# Patient Record
Sex: FEMALE | Race: WHITE | NOT HISPANIC OR LATINO | Employment: OTHER | URBAN - METROPOLITAN AREA
[De-identification: names, ages, dates, MRNs, and addresses within clinical notes are randomized per-mention and may not be internally consistent; named-entity substitution may affect disease eponyms.]

---

## 2017-08-10 ENCOUNTER — RADIANT APPOINTMENT (OUTPATIENT)
Dept: MAMMOGRAPHY | Facility: CLINIC | Age: 52
End: 2017-08-10
Attending: FAMILY MEDICINE
Payer: COMMERCIAL

## 2017-08-10 DIAGNOSIS — Z12.31 VISIT FOR SCREENING MAMMOGRAM: ICD-10-CM

## 2017-08-10 PROCEDURE — 77063 BREAST TOMOSYNTHESIS BI: CPT | Performed by: STUDENT IN AN ORGANIZED HEALTH CARE EDUCATION/TRAINING PROGRAM

## 2017-08-10 PROCEDURE — G0202 SCR MAMMO BI INCL CAD: HCPCS | Performed by: STUDENT IN AN ORGANIZED HEALTH CARE EDUCATION/TRAINING PROGRAM

## 2017-08-15 ENCOUNTER — OFFICE VISIT (OUTPATIENT)
Dept: PEDIATRICS | Facility: CLINIC | Age: 52
End: 2017-08-15
Payer: COMMERCIAL

## 2017-08-15 VITALS
HEART RATE: 67 BPM | SYSTOLIC BLOOD PRESSURE: 116 MMHG | TEMPERATURE: 97.5 F | BODY MASS INDEX: 25.85 KG/M2 | DIASTOLIC BLOOD PRESSURE: 70 MMHG | WEIGHT: 170.6 LBS | OXYGEN SATURATION: 100 % | HEIGHT: 68 IN

## 2017-08-15 DIAGNOSIS — Z23 NEED FOR MMR VACCINE: ICD-10-CM

## 2017-08-15 DIAGNOSIS — Z11.1 SCREENING EXAMINATION FOR PULMONARY TUBERCULOSIS: ICD-10-CM

## 2017-08-15 DIAGNOSIS — Z00.00 ROUTINE GENERAL MEDICAL EXAMINATION AT A HEALTH CARE FACILITY: Primary | ICD-10-CM

## 2017-08-15 PROCEDURE — 90471 IMMUNIZATION ADMIN: CPT | Performed by: FAMILY MEDICINE

## 2017-08-15 PROCEDURE — 99396 PREV VISIT EST AGE 40-64: CPT | Mod: 25 | Performed by: FAMILY MEDICINE

## 2017-08-15 PROCEDURE — 90707 MMR VACCINE SC: CPT | Performed by: FAMILY MEDICINE

## 2017-08-15 PROCEDURE — 86580 TB INTRADERMAL TEST: CPT | Performed by: FAMILY MEDICINE

## 2017-08-15 ASSESSMENT — PAIN SCALES - GENERAL: PAINLEVEL: NO PAIN (0)

## 2017-08-15 NOTE — PROGRESS NOTES
The patient is asked the following questions today and these are her answers:    -Have you had a mantoux administered in the past 30 days?    No  -Have you had a previous positive Mantoux.  No  -Have you received BCG in the past.  No  -Have you had a live vaccine  (MMR, Varicella, OPV, Yellow Fever) in the last 6 weeks.  No  -Have you had and active  viral or bacterial infection in the past 6 weeks.  No  -Have you received corticosteroids or immunosuppressive agents in the past 6 weeks.  No  -Have you been diagnosed with HIV?  No  -Do you have a maglinancy?  No    Patient informed of need to return to clinic in 48-72 hours for mantoux reading. Patient states understanding and agrees to plan.  Jan Little, CMA

## 2017-08-15 NOTE — MR AVS SNAPSHOT
After Visit Summary   8/15/2017    Clarita Rock    MRN: [id number]           Patient Information     Date Of Birth          1965        Visit Information        Provider Department      8/15/2017 12:00 PM Zahida Wiley MD [facility]        Today's Diagnoses     Routine general medical examination at a health care facility    -  1    Need for MMR vaccine        Screening examination for pulmonary tuberculosis          Care Instructions    Get the MMR shot today  Get the mantoux test today    Please drop us a copy of your immunization    Scheduled for fasting labs  - see appointment details below      Preventive Health Recommendations  Female Ages 50 - 64    Yearly exam: See your health care provider every year in order to  o Review health changes.   o Discuss preventive care.    o Review your medicines if your doctor has prescribed any.      Get a Pap test every three years (unless you have an abnormal result and your provider advises testing more often).    If you get Pap tests with HPV test, you only need to test every 5 years, unless you have an abnormal result.     You do not need a Pap test if your uterus was removed (hysterectomy) and you have not had cancer.    You should be tested each year for STDs (sexually transmitted diseases) if you're at risk.     Have a mammogram every 1 to 2 years.    Have a colonoscopy at age 50, or have a yearly FIT test (stool test). These exams screen for colon cancer.      Have a cholesterol test every 5 years, or more often if advised.    Have a diabetes test (fasting glucose) every three years. If you are at risk for diabetes, you should have this test more often.     If you are at risk for osteoporosis (brittle bone disease), think about having a bone density scan (DEXA).    Shots: Get a flu shot each year. Get a tetanus shot every 10 years.    Nutrition:     Eat at least 5 servings of fruits and vegetables each day.    Eat  whole-grain bread, whole-wheat pasta and brown rice instead of white grains and rice.    Talk to your provider about Calcium and Vitamin D.     Lifestyle    Exercise at least 150 minutes a week (30 minutes a day, 5 days a week). This will help you control your weight and prevent disease.    Limit alcohol to one drink per day.    No smoking.     Wear sunscreen to prevent skin cancer.     See your dentist every six months for an exam and cleaning.    See your eye doctor every 1 to 2 years.            Follow-ups after your visit        Your next 10 appointments already scheduled     Aug 18, 2017 11:20 AM CDT   LAB with LAB FIRST FLOOR [facility] ([facility])    [id number] [place] [address] [phone]           Patient must bring picture ID. Patient should be prepared to give a urine specimen  Please do not eat 10-12 hours before your appointment if you are coming in fasting for labs on lipids, cholesterol, or glucose (sugar). Pregnant women should follow their Care Team instructions. Water with medications is okay. Do not drink coffee or other fluids. If you have concerns about taking  your medications, please ask at office or if scheduling via Shoprocket, send a message by clicking on Secure Messaging, Message Your Care Team.            Aug 18, 2017 11:40 AM CDT   Nurse Only with [facility] ([facility])    [address] [place] [address] [address] [facility] [address] [phone]            Sep 05, 2017   Procedure with William Charles Duane, MD   [facility] (--)    [address] [place] [address] [facility] [address] [phone]              Who to contact     If you have questions or need follow up information about today's clinic visit or your schedule please contact [facility] directly at [phone].  Normal or non-critical lab and imaging results will be communicated to you by  "MyChart, letter or phone within 4 business days after the clinic has received the results. If you do not hear from us within 7 days, please contact the clinic through NetSpendhart or phone. If you have a critical or abnormal lab result, we will notify you by phone as soon as possible.  Submit refill requests through [employer] or call your pharmacy and they will forward the refill request to us. Please allow 3 business days for your refill to be completed.          Additional Information About Your Visit        [employer] Information     [employer] gives you secure access to your electronic health record. If you see a primary care provider, you can also send messages to your care team and make appointments. If you have questions, please call your primary care clinic.  If you do not have a primary care provider, please call [phone] and they will assist you.      [employer] is an electronic gateway that provides easy, online access to your medical records. With [employer], you can request a clinic appointment, read your test results, renew a prescription or communicate with your care team.     To access your existing account, please contact your [facility] or call [phone] for assistance.        Care EveryWhere ID     This is your Care EveryWhere ID. This could be used by other organizations to access your [place] medical records  [id number]        Your Vitals Were     Pulse Temperature Height Last Period Pulse Oximetry BMI (Body Mass Index)    67 97.5  F (36.4  C) (Oral) 5' 8.25\" (1.734 m) 08/08/2017 (Exact Date) 100% 25.75 kg/m2       Blood Pressure from Last 3 Encounters:   08/15/17 116/70   01/15/16 104/75    Weight from Last 3 Encounters:   08/15/17 170 lb 9.6 oz (77.4 kg)   01/15/16 163 lb 9.6 oz (74.2 kg)              We Performed the Following     MMR VIRUS IMMUNIZATION, SUBCUT     TB INTRADERMAL TEST        Primary Care Provider Office Phone # Fax #    Zahida Wiley [phone] " [phone]       [address]  [facility] [address]        Equal Access to Services     RICK CAMARA : Hadii natalia braga lia Spear, wakeeganda luqadaha, qaybta kaalmada rich, humberto wandain hayaageraldine ayersana maria sam ng. So [facility] [phone].    ATENCIÓN: Si habla español, tiene a goldsmith disposición servicios gratuitos de asistencia lingüística. Llame al [phone].    We comply with applicable federal civil rights laws and [place] laws. We do not discriminate on the basis of race, color, national origin, age, disability sex, sexual orientation or gender identity.            Thank you!     Thank you for choosing [facility]  for your care. Our goal is always to provide you with excellent care. Hearing back from our patients is one way we can continue to improve our services. Please take a few minutes to complete the written survey that you may receive in the mail after your visit with us. Thank you!             Your Updated Medication List - Protect others around you: Learn how to safely use, store and throw away your medicines at www.disposemymeds.org.          This list is accurate as of: 8/15/17 12:42 PM.  Always use your most recent med list.                   Brand Name Dispense Instructions for use Diagnosis    DAILY MULTIVITAMIN PO      Take 1 tablet by mouth daily        FINACEA 15 % gel   Generic drug:  Azelaic Acid      Apply topically daily        OMEGA-3 FISH OIL PO      Take 1,200 mg by mouth daily        VITAMIN D (CHOLECALCIFEROL) PO      Take 5,000 Units by mouth daily

## 2017-08-15 NOTE — NURSING NOTE
"Chief Complaint   Patient presents with     Physical       Initial /70  Pulse 67  Temp 97.5  F (36.4  C) (Oral)  Ht 5' 8.25\" (1.734 m)  Wt 170 lb 9.6 oz (77.4 kg)  LMP 08/08/2017 (Exact Date)  SpO2 100%  BMI 25.75 kg/m2 Estimated body mass index is 25.75 kg/(m^2) as calculated from the following:    Height as of this encounter: 5' 8.25\" (1.734 m).    Weight as of this encounter: 170 lb 9.6 oz (77.4 kg).  BP completed using cuff size: cayetano Little CMA    "

## 2017-08-15 NOTE — PATIENT INSTRUCTIONS
Get the MMR shot today  Get the mantoux test today    Please drop us a copy of your immunization    Scheduled for fasting labs  - see appointment details below      Preventive Health Recommendations  Female Ages 50 - 64    Yearly exam: See your health care provider every year in order to  o Review health changes.   o Discuss preventive care.    o Review your medicines if your doctor has prescribed any.      Get a Pap test every three years (unless you have an abnormal result and your provider advises testing more often).    If you get Pap tests with HPV test, you only need to test every 5 years, unless you have an abnormal result.     You do not need a Pap test if your uterus was removed (hysterectomy) and you have not had cancer.    You should be tested each year for STDs (sexually transmitted diseases) if you're at risk.     Have a mammogram every 1 to 2 years.    Have a colonoscopy at age 50, or have a yearly FIT test (stool test). These exams screen for colon cancer.      Have a cholesterol test every 5 years, or more often if advised.    Have a diabetes test (fasting glucose) every three years. If you are at risk for diabetes, you should have this test more often.     If you are at risk for osteoporosis (brittle bone disease), think about having a bone density scan (DEXA).    Shots: Get a flu shot each year. Get a tetanus shot every 10 years.    Nutrition:     Eat at least 5 servings of fruits and vegetables each day.    Eat whole-grain bread, whole-wheat pasta and brown rice instead of white grains and rice.    Talk to your provider about Calcium and Vitamin D.     Lifestyle    Exercise at least 150 minutes a week (30 minutes a day, 5 days a week). This will help you control your weight and prevent disease.    Limit alcohol to one drink per day.    No smoking.     Wear sunscreen to prevent skin cancer.     See your dentist every six months for an exam and cleaning.    See your eye doctor every 1 to 2  years.

## 2017-08-15 NOTE — PROGRESS NOTES
SUBJECTIVE:   CC: Clarita Rock is an 52 year old woman who presents for preventive health visit.     Physical   Annual:     Getting at least 3 servings of Calcium per day::  Yes    Bi-annual eye exam::  Yes    Dental care twice a year::  Yes    Sleep apnea or symptoms of sleep apnea::  None    Diet::  Regular (no restrictions)    Frequency of exercise::  2-3 days/week    Duration of exercise::  15-30 minutes    Taking medications regularly::  Not Applicable    Additional concerns today::  No              Today's PHQ-2 Score:   PHQ-2 ( 1999 Pfizer) 8/12/2017   Q1: Little interest or pleasure in doing things 0   Q2: Feeling down, depressed or hopeless 0   PHQ-2 Score 0   Q1: Little interest or pleasure in doing things Not at all   Q2: Feeling down, depressed or hopeless Not at all   PHQ-2 Score 0       Abuse: Current or Past(Physical, Sexual or Emotional)- No  Do you feel safe in your environment - Yes    Social History   Substance Use Topics     Smoking status: Former Smoker     Types: Cigarettes     Smokeless tobacco: Never Used     Alcohol use Yes     The patient does not drink >3 drinks per day nor >7 drinks per week.    Reviewed orders with patient.  Reviewed health maintenance and updated orders accordingly - Yes  Labs reviewed in EPIC  BP Readings from Last 3 Encounters:   08/15/17 116/70   01/15/16 104/75    Wt Readings from Last 3 Encounters:   08/15/17 170 lb 9.6 oz (77.4 kg)   01/15/16 163 lb 9.6 oz (74.2 kg)                  Patient Active Problem List   Diagnosis     Family history of thyroid disease     CARDIOVASCULAR SCREENING; LDL GOAL LESS THAN 160     Seasonal allergic rhinitis     Idiopathic urticaria     Family history of colon cancer, brother     Other fatigue     History of uterine fibroid     Past Surgical History:   Procedure Laterality Date     LASIK       VASCULAR SURGERY      varicose veins       Social History   Substance Use Topics     Smoking status: Former Smoker     Types:  Cigarettes     Smokeless tobacco: Never Used     Alcohol use Yes     Family History   Problem Relation Age of Onset     Thyroid Disease Mother      Childhood Heart Disease Mother      Obesity Mother      Thyroid Disease Sister      with thyroid removed     Colon Cancer Brother          Current Outpatient Prescriptions   Medication Sig Dispense Refill     FINACEA 15 % gel Apply topically daily       Multiple Vitamins-Minerals (DAILY MULTIVITAMIN PO) Take 1 tablet by mouth daily       VITAMIN D, CHOLECALCIFEROL, PO Take 5,000 Units by mouth daily       Omega-3 Fatty Acids (OMEGA-3 FISH OIL PO) Take 1,200 mg by mouth daily       No Known Allergies  Recent Labs   Lab Test  01/22/16   0924   LDL  79   HDL  66   TRIG  65   ALT  22   CR  0.72   GFRESTIMATED  86   GFRESTBLACK  >90   GFR Calc     POTASSIUM  4.0   TSH  0.97              Patient over age 50, mutual decision to screen reflected in health maintenance.      Pertinent mammograms are reviewed under the imaging tab.  History of abnormal Pap smear: NO - age 30- 65 PAP every 3 years recommended    Reviewed and updated as needed this visit by clinical staffTobacco  Allergies  Meds  Med Hx  Surg Hx  Fam Hx  Soc Hx        Reviewed and updated as needed this visit by Provider          Past Medical History:   Diagnosis Date     NO ACTIVE PROBLEMS       Past Surgical History:   Procedure Laterality Date     LASIK       VASCULAR SURGERY      varicose veins     Obstetric History     No data available            ROS:  C: NEGATIVE for fever, chills, change in weight  I: NEGATIVE for worrisome rashes, moles or lesions  E: NEGATIVE for vision changes or irritation  ENT: NEGATIVE for ear, mouth and throat problems  R: NEGATIVE for significant cough or SOB  B: NEGATIVE for masses, tenderness or discharge  CV: NEGATIVE for chest pain, palpitations or peripheral edema  GI: NEGATIVE for nausea, abdominal pain, heartburn, or change in bowel habits  : NEGATIVE  "for unusual urinary or vaginal symptoms. No vaginal bleeding.  M: NEGATIVE for significant arthralgias or myalgia  N: NEGATIVE for weakness, dizziness or paresthesias  E: NEGATIVE for temperature intolerance, skin/hair changes  H: NEGATIVE for bleeding problems  P: NEGATIVE for changes in mood or affect      OBJECTIVE:   /70  Pulse 67  Temp 97.5  F (36.4  C) (Oral)  Ht 5' 8.25\" (1.734 m)  Wt 170 lb 9.6 oz (77.4 kg)  LMP 08/08/2017 (Exact Date)  SpO2 100%  BMI 25.75 kg/m2  EXAM:  GENERAL: healthy, alert and no distress  EYES: Eyes grossly normal to inspection, PERRL and conjunctivae and sclerae normal  HENT: ear canals and TM's normal, nose and mouth without ulcers or lesions  NECK: no adenopathy, no asymmetry, masses, or scars and thyroid normal to palpation  RESP: lungs clear to auscultation - no rales, rhonchi or wheezes  BREAST: normal without masses, tenderness or nipple discharge and no palpable axillary masses or adenopathy  CV: regular rate and rhythm, normal S1 S2, no S3 or S4, no murmur, click or rub, no peripheral edema and peripheral pulses strong  ABDOMEN: soft, nontender, no hepatosplenomegaly, no masses and bowel sounds normal   (female): deferred per patient's request  MS: no gross musculoskeletal defects noted, no edema  SKIN: no suspicious lesions or rashes  NEURO: Normal strength and tone, mentation intact and speech normal  PSYCH: mentation appears normal, affect normal/bright    ASSESSMENT/PLAN:   1. Routine general medical examination at a health care facility  Discussed on regular exercises, daily calcium intake, healthy eating, self breast exams monthly and routine dental checks    - MMR VIRUS IMMUNIZATION, SUBCUT  - TB INTRADERMAL TEST    2. Need for MMR vaccine  Works in health care, speech therapist  - MMR VIRUS IMMUNIZATION, SUBCUT    3. Screening examination for pulmonary tuberculosis  as above    - TB INTRADERMAL TEST    COUNSELING:  Reviewed preventive health counseling, " "as reflected in patient instructions  Special attention given to:        Regular exercise       Healthy diet/nutrition       Vision screening       Immunizations    Vaccinated for: MMR           Osteoporosis Prevention/Bone Health       Colon cancer screening       Consider Hep C screening for patients born between 1945 and 1965       (Elizabeth)menopause management       The 10-year ASCVD risk score (Rosemarie MALAVE Jr, et al., 2013) is: 0.8%    Values used to calculate the score:      Age: 52 years      Sex: Female      Is Non- : No      Diabetic: No      Tobacco smoker: No      Systolic Blood Pressure: 116 mmHg      Is BP treated: No      HDL Cholesterol: 66 mg/dL      Total Cholesterol: 158 mg/dL           reports that she has quit smoking. Her smoking use included Cigarettes. She has never used smokeless tobacco.    Estimated body mass index is 25.75 kg/(m^2) as calculated from the following:    Height as of this encounter: 5' 8.25\" (1.734 m).    Weight as of this encounter: 170 lb 9.6 oz (77.4 kg).         Counseling Resources:  ATP IV Guidelines  Pooled Cohorts Equation Calculator  Breast Cancer Risk Calculator  FRAX Risk Assessment  ICSI Preventive Guidelines  Dietary Guidelines for Americans, 2010  [employer]'s MyPlate  ASA Prophylaxis  Lung CA Screening    Zahida Wiley MD  [facility] Health ServicesAnswers for HPI/ROS submitted by the patient on 8/12/2017   PHQ-2 Score: 0  Chart documentation done in part with Dragon Voice recognition Software. Although reviewed after completion, some word and grammatical error may remain.    "

## 2017-08-18 ENCOUNTER — ALLIED HEALTH/NURSE VISIT (OUTPATIENT)
Dept: PEDIATRICS | Facility: CLINIC | Age: 52
End: 2017-08-18
Payer: COMMERCIAL

## 2017-08-18 DIAGNOSIS — Z11.1 ENCOUNTER FOR PPD SKIN TEST READING: Primary | ICD-10-CM

## 2017-08-18 DIAGNOSIS — Z13.6 CARDIOVASCULAR SCREENING; LDL GOAL LESS THAN 160: ICD-10-CM

## 2017-08-18 DIAGNOSIS — Z13.0 SCREENING FOR DEFICIENCY ANEMIA: ICD-10-CM

## 2017-08-18 DIAGNOSIS — Z13.1 SCREENING FOR DIABETES MELLITUS (DM): ICD-10-CM

## 2017-08-18 DIAGNOSIS — Z83.49 FAMILY HISTORY OF THYROID DISEASE: ICD-10-CM

## 2017-08-18 DIAGNOSIS — Z11.59 NEED FOR HEPATITIS C SCREENING TEST: ICD-10-CM

## 2017-08-18 DIAGNOSIS — Z13.29 SCREENING FOR THYROID DISORDER: ICD-10-CM

## 2017-08-18 LAB
ALBUMIN SERPL-MCNC: 4.4 G/DL (ref 3.4–5)
ALP SERPL-CCNC: 43 U/L (ref 40–150)
ALT SERPL W P-5'-P-CCNC: 21 U/L (ref 0–50)
ANION GAP SERPL CALCULATED.3IONS-SCNC: 5 MMOL/L (ref 3–14)
AST SERPL W P-5'-P-CCNC: 12 U/L (ref 0–45)
BASOPHILS # BLD AUTO: 0.1 10E9/L (ref 0–0.2)
BASOPHILS NFR BLD AUTO: 0.8 %
BILIRUB SERPL-MCNC: 0.6 MG/DL (ref 0.2–1.3)
BUN SERPL-MCNC: 18 MG/DL (ref 7–30)
CALCIUM SERPL-MCNC: 9.1 MG/DL (ref 8.5–10.1)
CHLORIDE SERPL-SCNC: 107 MMOL/L (ref 94–109)
CHOLEST SERPL-MCNC: 194 MG/DL
CO2 SERPL-SCNC: 28 MMOL/L (ref 20–32)
CREAT SERPL-MCNC: 0.73 MG/DL (ref 0.52–1.04)
DIFFERENTIAL METHOD BLD: NORMAL
EOSINOPHIL # BLD AUTO: 0.5 10E9/L (ref 0–0.7)
EOSINOPHIL NFR BLD AUTO: 8 %
ERYTHROCYTE [DISTWIDTH] IN BLOOD BY AUTOMATED COUNT: 13.9 % (ref 10–15)
GFR SERPL CREATININE-BSD FRML MDRD: 83 ML/MIN/1.7M2
GLUCOSE SERPL-MCNC: 86 MG/DL (ref 70–99)
HCT VFR BLD AUTO: 39.3 % (ref 35–47)
HDLC SERPL-MCNC: 94 MG/DL
HGB BLD-MCNC: 13.5 G/DL (ref 11.7–15.7)
LDLC SERPL CALC-MCNC: 88 MG/DL
LYMPHOCYTES # BLD AUTO: 2 10E9/L (ref 0.8–5.3)
LYMPHOCYTES NFR BLD AUTO: 33.5 %
MCH RBC QN AUTO: 31.8 PG (ref 26.5–33)
MCHC RBC AUTO-ENTMCNC: 34.4 G/DL (ref 31.5–36.5)
MCV RBC AUTO: 93 FL (ref 78–100)
MONOCYTES # BLD AUTO: 0.4 10E9/L (ref 0–1.3)
MONOCYTES NFR BLD AUTO: 7.4 %
NEUTROPHILS # BLD AUTO: 3 10E9/L (ref 1.6–8.3)
NEUTROPHILS NFR BLD AUTO: 50.3 %
NONHDLC SERPL-MCNC: 100 MG/DL
PLATELET # BLD AUTO: 246 10E9/L (ref 150–450)
POTASSIUM SERPL-SCNC: 4.4 MMOL/L (ref 3.4–5.3)
PPDINDURATION: 0 MM (ref 0–5)
PPDREDNESS: 0 MM
PROT SERPL-MCNC: 7.8 G/DL (ref 6.8–8.8)
RBC # BLD AUTO: 4.25 10E12/L (ref 3.8–5.2)
SODIUM SERPL-SCNC: 140 MMOL/L (ref 133–144)
TRIGL SERPL-MCNC: 60 MG/DL
TSH SERPL DL<=0.005 MIU/L-ACNC: 0.95 MU/L (ref 0.4–4)
WBC # BLD AUTO: 6 10E9/L (ref 4–11)

## 2017-08-18 PROCEDURE — 80061 LIPID PANEL: CPT | Performed by: FAMILY MEDICINE

## 2017-08-18 PROCEDURE — 36415 COLL VENOUS BLD VENIPUNCTURE: CPT | Performed by: FAMILY MEDICINE

## 2017-08-18 PROCEDURE — 86803 HEPATITIS C AB TEST: CPT | Performed by: FAMILY MEDICINE

## 2017-08-18 PROCEDURE — 99207 ZZC NO CHARGE LOS: CPT

## 2017-08-18 PROCEDURE — 80050 GENERAL HEALTH PANEL: CPT | Performed by: FAMILY MEDICINE

## 2017-08-18 NOTE — MR AVS SNAPSHOT
After Visit Summary   8/18/2017    Clarita Rock    MRN: [id number]           Patient Information     Date Of Birth          1965        Visit Information        Provider Department      8/18/2017 11:40 AM [facility] ANCILLARY [facility]        Today's Diagnoses     Encounter for PPD skin test reading    -  1       Follow-ups after your visit        Your next 10 appointments already scheduled     Sep 05, 2017   Procedure with William Charles Duane, MD   [facility] (--)    [address] [place]  [place] [address] [phone]              Who to contact     If you have questions or need follow up information about today's clinic visit or your schedule please contact [facility] directly at [phone].  Normal or non-critical lab and imaging results will be communicated to you by MyChart, letter or phone within 4 business days after the clinic has received the results. If you do not hear from us within 7 days, please contact the clinic through DataProm[employer] or phone. If you have a critical or abnormal lab result, we will notify you by phone as soon as possible.  Submit refill requests through YouHelp or call your pharmacy and they will forward the refill request to us. Please allow 3 business days for your refill to be completed.          Additional Information About Your Visit        [employer] Information     YouHelp gives you secure access to your electronic health record. If you see a primary care provider, you can also send messages to your care team and make appointments. If you have questions, please call your primary care clinic.  If you do not have a primary care provider, please call [phone] and they will assist you.      YouHelp is an electronic gateway that provides easy, online access to your medical records. With YouHelp, you can request a clinic appointment, read your test results, renew a prescription or communicate with your care  team.     To access your existing account, please contact your [facility] or call [phone] for assistance.        Care EveryWhere ID     This is your Care EveryWhere ID. This could be used by other organizations to access your [place] medical records  [id number]        Your Vitals Were     Last Period                   08/08/2017 (Exact Date)            Blood Pressure from Last 3 Encounters:   08/15/17 116/70   01/15/16 104/75    Weight from Last 3 Encounters:   08/15/17 170 lb 9.6 oz (77.4 kg)   01/15/16 163 lb 9.6 oz (74.2 kg)              Today, you had the following     No orders found for display       Primary Care Provider Office Phone # Fax #    Zahida Wiley [phone] [address]  [facility] [address]        Equal Access to Services     [facility]: Hadii aad ku hadasho Soomaali, waaxda luqadaha, qaybta kaalmada adeegyada, humberto muñozin hayaan kingsley vargas . So [facility] [phone].    ATENCIÓN: Si habla español, tiene a goldsmith disposición servicios gratuitos de asistencia lingüística. Llame al [phone].    We comply with applicable federal civil rights laws and [place] laws. We do not discriminate on the basis of race, color, national origin, age, disability sex, sexual orientation or gender identity.            Thank you!     Thank you for choosing [facility]  for your care. Our goal is always to provide you with excellent care. Hearing back from our patients is one way we can continue to improve our services. Please take a few minutes to complete the written survey that you may receive in the mail after your visit with us. Thank you!             Your Updated Medication List - Protect others around you: Learn how to safely use, store and throw away your medicines at www.disposemymeds.org.          This list is accurate as of: 8/18/17 11:51 AM.  Always use your most recent med list.                   Brand  Name Dispense Instructions for use Diagnosis    DAILY MULTIVITAMIN PO      Take 1 tablet by mouth daily        FINACEA 15 % gel   Generic drug:  Azelaic Acid      Apply topically daily        OMEGA-3 FISH OIL PO      Take 1,200 mg by mouth daily        VITAMIN D (CHOLECALCIFEROL) PO      Take 5,000 Units by mouth daily

## 2017-08-18 NOTE — NURSING NOTE
Clarita Rock comes into clinic today at the request of Dr. Wiley Ordering Provider for mantoux test.    Mantou Results:      Mantoux placed at [facility](location) on 8/15/2017 (date) at 1243(time) to right forearm.    Mantoux results read 8/18/2017 (date) at 1146(time).    NEGATIVE. No induration.  No swelling.  No redness.  Induration:  0mm    Plan:  Patient given copy of results for her records.  Rolanda Og RN,   [facility]            This service provided today was under the supervising provider of the mercy Cárdenas CNP, who was available if needed.    Reason for visit: Mantoux results reading    Rolanda Og

## 2017-08-18 NOTE — PROGRESS NOTES
Dear Dr. Inez Otto is out of the office. I'm review your results on her behalf.     Here are your recent results which are within the expected range. Please continue with your current plan of care.     Please call or Mychart to our office if you have further questions.     Tiago Reynoso MD

## 2017-08-19 LAB — HCV AB SERPL QL IA: NONREACTIVE

## 2017-08-20 NOTE — PROGRESS NOTES
Marina Rock,    Attached are your test results.  -Hepatitis C antibody screen test shows no signs of a previous hepatitis C infection.   Please contact us if you have any questions.    Tresa Cárdenas, CNP

## 2017-09-05 ENCOUNTER — TELEPHONE (OUTPATIENT)
Dept: PEDIATRICS | Facility: CLINIC | Age: 52
End: 2017-09-05

## 2017-09-05 ENCOUNTER — HOSPITAL ENCOUNTER (OUTPATIENT)
Facility: AMBULATORY SURGERY CENTER | Age: 52
Discharge: HOME | End: 2017-09-05
Attending: INTERNAL MEDICINE | Admitting: INTERNAL MEDICINE
Payer: COMMERCIAL

## 2017-09-05 ENCOUNTER — SURGERY (OUTPATIENT)
Age: 52
End: 2017-09-05

## 2017-09-05 VITALS
OXYGEN SATURATION: 100 % | HEART RATE: 73 BPM | DIASTOLIC BLOOD PRESSURE: 64 MMHG | TEMPERATURE: 98 F | SYSTOLIC BLOOD PRESSURE: 110 MMHG | RESPIRATION RATE: 18 BRPM

## 2017-09-05 DIAGNOSIS — Z12.11 SCREENING FOR COLON CANCER: Primary | ICD-10-CM

## 2017-09-05 PROCEDURE — G8907 PT DOC NO EVENTS ON DISCHARG: HCPCS

## 2017-09-05 PROCEDURE — 45378 DIAGNOSTIC COLONOSCOPY: CPT | Mod: 74

## 2017-09-05 PROCEDURE — G8918 PT W/O PREOP ORDER IV AB PRO: HCPCS

## 2017-09-05 RX ORDER — FENTANYL CITRATE 50 UG/ML
INJECTION, SOLUTION INTRAMUSCULAR; INTRAVENOUS PRN
Status: DISCONTINUED | OUTPATIENT
Start: 2017-09-05 | End: 2017-09-05 | Stop reason: HOSPADM

## 2017-09-05 RX ORDER — LIDOCAINE 40 MG/G
CREAM TOPICAL
Status: DISCONTINUED | OUTPATIENT
Start: 2017-09-05 | End: 2017-09-06 | Stop reason: HOSPADM

## 2017-09-05 RX ORDER — ONDANSETRON 2 MG/ML
4 INJECTION INTRAMUSCULAR; INTRAVENOUS
Status: DISCONTINUED | OUTPATIENT
Start: 2017-09-05 | End: 2017-09-06 | Stop reason: HOSPADM

## 2017-09-05 RX ADMIN — FENTANYL CITRATE 50 MCG: 50 INJECTION, SOLUTION INTRAMUSCULAR; INTRAVENOUS at 08:08

## 2017-09-05 RX ADMIN — FENTANYL CITRATE 100 MCG: 50 INJECTION, SOLUTION INTRAMUSCULAR; INTRAVENOUS at 07:52

## 2017-09-05 NOTE — TELEPHONE ENCOUNTER
Please  inform patient  Reviewed  message from Dr. Rojas regarding incomplete colonoscopy as mentioned below  Per his request, orders placed for CT colonography   please help patient schedule( inform patient to check with insurance for coverage)

## 2017-09-05 NOTE — TELEPHONE ENCOUNTER
Called patient and gave message per Dr. Wiley.  Patient verbalized understanding and agreement to plan. She inquired about the CPT code to discuss with her insurance.  She will call back if further questions.  She did not want to be transferred as she needs to look at her schedule first.    Rhonda Ramsey RN, [facility]

## 2017-09-05 NOTE — TELEPHONE ENCOUNTER
----- Message from William Charles Duane, MD sent at 9/5/2017  9:06 AM CDT -----  Regarding: Incompletel colonoscopy  Hi Dr. Wiley  This patient had a colonoscopy this morning.  Unfortunately, despite more than an hour of trying various scopes and maneuvers, I was unable to reach her cecum.  At this point I would recommend a virtual colonoscopy.  Would you be willing to order that in as much as you will be following her.  Thanks  William Duane

## 2017-10-02 LAB — COLONOSCOPY: NORMAL

## 2017-10-08 ENCOUNTER — TRANSFERRED RECORDS (OUTPATIENT)
Dept: HEALTH INFORMATION MANAGEMENT | Facility: CLINIC | Age: 52
End: 2017-10-08

## 2018-10-22 ENCOUNTER — TRANSFERRED RECORDS (OUTPATIENT)
Dept: HEALTH INFORMATION MANAGEMENT | Facility: CLINIC | Age: 53
End: 2018-10-22

## 2019-01-30 ENCOUNTER — MYC MEDICAL ADVICE (OUTPATIENT)
Dept: PEDIATRICS | Facility: CLINIC | Age: 54
End: 2019-01-30

## 2019-04-19 ENCOUNTER — HEALTH MAINTENANCE LETTER (OUTPATIENT)
Age: 54
End: 2019-04-19

## 2019-04-26 ENCOUNTER — OFFICE VISIT (OUTPATIENT)
Dept: PEDIATRICS | Facility: CLINIC | Age: 54
End: 2019-04-26
Payer: COMMERCIAL

## 2019-04-26 VITALS
OXYGEN SATURATION: 99 % | DIASTOLIC BLOOD PRESSURE: 75 MMHG | HEIGHT: 68 IN | HEART RATE: 74 BPM | BODY MASS INDEX: 25.48 KG/M2 | TEMPERATURE: 96.8 F | WEIGHT: 168.1 LBS | SYSTOLIC BLOOD PRESSURE: 112 MMHG

## 2019-04-26 DIAGNOSIS — Z80.0 FAMILY HISTORY OF COLON CANCER: ICD-10-CM

## 2019-04-26 DIAGNOSIS — Z13.1 SCREENING FOR DIABETES MELLITUS (DM): ICD-10-CM

## 2019-04-26 DIAGNOSIS — Z11.4 SCREENING FOR HUMAN IMMUNODEFICIENCY VIRUS: ICD-10-CM

## 2019-04-26 DIAGNOSIS — Z12.39 BREAST CANCER SCREENING: ICD-10-CM

## 2019-04-26 DIAGNOSIS — Z11.1 SCREENING EXAMINATION FOR PULMONARY TUBERCULOSIS: ICD-10-CM

## 2019-04-26 DIAGNOSIS — Q43.8 TORTUOUS COLON: ICD-10-CM

## 2019-04-26 DIAGNOSIS — Z83.49 FAMILY HISTORY OF THYROID DISEASE: ICD-10-CM

## 2019-04-26 DIAGNOSIS — Z00.00 ROUTINE GENERAL MEDICAL EXAMINATION AT A HEALTH CARE FACILITY: Primary | ICD-10-CM

## 2019-04-26 DIAGNOSIS — Z13.6 CARDIOVASCULAR SCREENING; LDL GOAL LESS THAN 160: ICD-10-CM

## 2019-04-26 LAB
CHOLEST SERPL-MCNC: 230 MG/DL
GLUCOSE SERPL-MCNC: 93 MG/DL (ref 70–99)
HDLC SERPL-MCNC: 100 MG/DL
LDLC SERPL CALC-MCNC: 112 MG/DL
NONHDLC SERPL-MCNC: 130 MG/DL
TRIGL SERPL-MCNC: 92 MG/DL

## 2019-04-26 PROCEDURE — 36415 COLL VENOUS BLD VENIPUNCTURE: CPT | Performed by: FAMILY MEDICINE

## 2019-04-26 PROCEDURE — 99396 PREV VISIT EST AGE 40-64: CPT | Performed by: FAMILY MEDICINE

## 2019-04-26 PROCEDURE — 80061 LIPID PANEL: CPT | Performed by: FAMILY MEDICINE

## 2019-04-26 PROCEDURE — 82947 ASSAY GLUCOSE BLOOD QUANT: CPT | Performed by: FAMILY MEDICINE

## 2019-04-26 PROCEDURE — 86481 TB AG RESPONSE T-CELL SUSP: CPT | Performed by: FAMILY MEDICINE

## 2019-04-26 PROCEDURE — 87389 HIV-1 AG W/HIV-1&-2 AB AG IA: CPT | Performed by: FAMILY MEDICINE

## 2019-04-26 ASSESSMENT — PAIN SCALES - GENERAL: PAINLEVEL: NO PAIN (0)

## 2019-04-26 ASSESSMENT — MIFFLIN-ST. JEOR: SCORE: 1406

## 2019-04-26 NOTE — PATIENT INSTRUCTIONS
Schedule for pap only exam in 1 week  Get the labs today  Schedule for GI consult    Preventive Health Recommendations  Female Ages 50 - 64    Yearly exam: See your health care provider every year in order to  o Review health changes.   o Discuss preventive care.    o Review your medicines if your doctor has prescribed any.      Get a Pap test every three years (unless you have an abnormal result and your provider advises testing more often).    If you get Pap tests with HPV test, you only need to test every 5 years, unless you have an abnormal result.     You do not need a Pap test if your uterus was removed (hysterectomy) and you have not had cancer.    You should be tested each year for STDs (sexually transmitted diseases) if you're at risk.     Have a mammogram every 1 to 2 years.    Have a colonoscopy at age 50, or have a yearly FIT test (stool test). These exams screen for colon cancer.      Have a cholesterol test every 5 years, or more often if advised.    Have a diabetes test (fasting glucose) every three years. If you are at risk for diabetes, you should have this test more often.     If you are at risk for osteoporosis (brittle bone disease), think about having a bone density scan (DEXA).    Shots: Get a flu shot each year. Get a tetanus shot every 10 years.    Nutrition:     Eat at least 5 servings of fruits and vegetables each day.    Eat whole-grain bread, whole-wheat pasta and brown rice instead of white grains and rice.    Get adequate Calcium and Vitamin D.     Lifestyle    Exercise at least 150 minutes a week (30 minutes a day, 5 days a week). This will help you control your weight and prevent disease.    Limit alcohol to one drink per day.    No smoking.     Wear sunscreen to prevent skin cancer.     See your dentist every six months for an exam and cleaning.    See your eye doctor every 1 to 2 years.

## 2019-04-26 NOTE — PROGRESS NOTES
SUBJECTIVE:   CC: Estela Rock is an 54 year old woman who presents for preventive health visit.     Healthy Habits:     Getting at least 3 servings of Calcium per day:  Yes    Bi-annual eye exam:  Yes    Dental care twice a year:  Yes    Sleep apnea or symptoms of sleep apnea:  None    Diet:  Carbohydrate counting, Gluten-free/reduced and Other    Frequency of exercise:  2-3 days/week    Duration of exercise:  15-30 minutes    Taking medications regularly:  Yes    Medication side effects:  None    PHQ-2 Total Score: 0    Additional concerns today:  No            Today's PHQ-2 Score:   PHQ-2 ( 1999 [employer]) 4/23/2019   Q1: Little interest or pleasure in doing things 0   Q2: Feeling down, depressed or hopeless 0   PHQ-2 Score 0   Q1: Little interest or pleasure in doing things Not at all   Q2: Feeling down, depressed or hopeless Not at all   PHQ-2 Score 0       Abuse: Current or Past(Physical, Sexual or Emotional)- No  Do you feel safe in your environment? Yes    Social History     Tobacco Use     Smoking status: Former Smoker     Types: Cigarettes     Smokeless tobacco: Never Used   Substance Use Topics     Alcohol use: Yes         Alcohol Use 4/23/2019   Prescreen: >3 drinks/day or >7 drinks/week? No   Prescreen: >3 drinks/day or >7 drinks/week? -       Reviewed orders with patient.  Reviewed health maintenance and updated orders accordingly - Yes  Labs reviewed in EPIC  BP Readings from Last 3 Encounters:   04/26/19 112/75   09/05/17 110/64   08/15/17 116/70    Wt Readings from Last 3 Encounters:   04/26/19 76.2 kg (168 lb 1.6 oz)   08/15/17 77.4 kg (170 lb 9.6 oz)   01/15/16 74.2 kg (163 lb 9.6 oz)                  Patient Active Problem List   Diagnosis     Family history of thyroid disease     CARDIOVASCULAR SCREENING; LDL GOAL LESS THAN 160     Seasonal allergic rhinitis     Idiopathic urticaria     Family history of colon cancer, brother     Other fatigue     History of uterine fibroid     Tortuous  colon     Past Surgical History:   Procedure Laterality Date     COLONOSCOPY WITH CO2 INSUFFLATION N/A 9/5/2017    Procedure: COLONOSCOPY WITH CO2 INSUFFLATION;  BMI: 24.93  Referring: Dr. Wiley  Diagnosis: Screening for colon cancer   Pharmacy: [facility] [id number];  Surgeon: Duane, William Charles, MD;  Location:  OR     [facility]       VASCULAR SURGERY      varicose veins       Social History     Tobacco Use     Smoking status: Former Smoker     Types: Cigarettes     Smokeless tobacco: Never Used   Substance Use Topics     Alcohol use: Yes     Family History   Problem Relation Age of Onset     Thyroid Disease Mother      Childhood Heart Disease Mother      Obesity Mother      Thyroid Disease Sister         with thyroid removed     Colon Cancer Brother          Current Outpatient Medications   Medication Sig Dispense Refill     MAGNESIUM GLYCINATE PLUS PO Take 1 tablet by mouth daily       Multiple Vitamins-Minerals (DAILY MULTIVITAMIN PO) Take 1 tablet by mouth daily       Omega-3 Fatty Acids (OMEGA-3 FISH OIL PO) Take 1,200 mg by mouth daily       VITAMIN D, CHOLECALCIFEROL, PO Take 5,000 Units by mouth daily       No Known Allergies  Recent Labs   Lab Test 08/18/17  1108 01/22/16  0924   LDL 88 79   HDL 94 66   TRIG 60 65   ALT 21 22   CR 0.73 0.72   GFRESTIMATED 83 86   GFRESTBLACK >90 >90  African American GFR Calc     POTASSIUM 4.4 4.0   TSH 0.95 0.97        Mammogram Screening: Patient over age 50, mutual decision to screen reflected in health maintenance.    Pertinent mammograms are reviewed under the imaging tab.  History of abnormal Pap smear: NO - age 30- 65 PAP every 3 years recommended  PAP / HPV 1/15/2016   PAP NIL     Reviewed and updated as needed this visit by clinical staff  Tobacco  Allergies  Meds  Med Hx  Surg Hx  Fam Hx  Soc Hx        Reviewed and updated as needed this visit by Provider          Past Medical History:   Diagnosis Date     NO ACTIVE PROBLEMS       Past Surgical  "History:   Procedure Laterality Date     COLONOSCOPY WITH CO2 INSUFFLATION N/A 9/5/2017    Procedure: COLONOSCOPY WITH CO2 INSUFFLATION;  BMI: 24.93  Referring: Dr. Wiley  Diagnosis: Screening for colon cancer   Pharmacy: [facility] [id number];  Surgeon: Duane, William Charles, MD;  Location: [facility]       VASCULAR SURGERY      varicose veins     OB History   No data available       Review of Systems  CONSTITUTIONAL: NEGATIVE for fever, chills, change in weight  INTEGUMENTARY/SKIN: NEGATIVE for worrisome rashes, moles or lesions  EYES: NEGATIVE for vision changes or irritation  ENT: NEGATIVE for ear, mouth and throat problems  RESP: NEGATIVE for significant cough or SOB  BREAST: NEGATIVE for masses, tenderness or discharge  CV: NEGATIVE for chest pain, palpitations or peripheral edema  GI: NEGATIVE for nausea, abdominal pain, heartburn, or change in bowel habits  : NEGATIVE for unusual urinary or vaginal symptoms. No vaginal bleeding.  MUSCULOSKELETAL: NEGATIVE for significant arthralgias or myalgia  NEURO: NEGATIVE for weakness, dizziness or paresthesias  ENDOCRINE: NEGATIVE for temperature intolerance, skin/hair changes  HEME/ALLERGY/IMMUNE: NEGATIVE for bleeding problems  PSYCHIATRIC: NEGATIVE for changes in mood or affect      OBJECTIVE:   /75 (BP Location: Right arm, Patient Position: Sitting, Cuff Size: Adult Regular)   Pulse 74   Temp 96.8  F (36  C) (Tympanic)   Ht 1.727 m (5' 8\")   Wt 76.2 kg (168 lb 1.6 oz)   LMP 04/23/2019   SpO2 99%   BMI 25.56 kg/m    Physical Exam  GENERAL APPEARANCE: healthy, alert and no distress  EYES: Eyes grossly normal to inspection, PERRL and conjunctivae and sclerae normal  HENT: ear canals and TM's normal, nose and mouth without ulcers or lesions, oropharynx clear and oral mucous membranes moist  NECK: no adenopathy, no asymmetry, masses, or scars and thyroid normal to palpation  RESP: lungs clear to auscultation - no rales, rhonchi or " wheezes  BREAST: normal without masses, tenderness or nipple discharge and no palpable axillary masses or adenopathy  CV: regular rate and rhythm, normal S1 S2, no S3 or S4, no murmur, click or rub, no peripheral edema and peripheral pulses strong  ABDOMEN: soft, nontender, no hepatosplenomegaly, no masses and bowel sounds normal   (female): Deferred due to patient's reported having current menstrual bleeding  MS: no musculoskeletal defects are noted and gait is age appropriate without ataxia  SKIN: no suspicious lesions or rashes  NEURO: Normal strength and tone, sensory exam grossly normal, mentation intact and speech normal  PSYCH: mentation appears normal and affect normal/bright    Diagnostic Test Results:  none     ASSESSMENT/PLAN:   1. Routine general medical examination at a health care facility  Discussed on regular exercises, daily calcium intake, healthy eating, self breast exams monthly and routine dental checks  recommended patient to have it done at the pharmacy after checking with insurance for coverage.      - MA Screening Digital Bilateral; Future  - Glucose  - Lipid panel reflex to direct LDL Fasting  - HIV Antigen Antibody Combo  - Quantiferon TB Gold Plus    2. Family history of colon cancer  Patient had history of tortuous colon, had a incomplete colonoscopy in 2017, had CT colonography.  Patient is not sure if she needs another study or colonoscopy due to her family history.  She vaguely recalls getting recommendation from Dr. Duane regarding colonoscopy in 2 years but she is worried about getting it done due to her long and tortuous colon  Patient will see gastroenterologist first for consult and then get the procedure done if needed.    - GASTROENTEROLOGY ADULT REF CONSULT ONLY    3. Tortuous colon  as above    - GASTROENTEROLOGY ADULT REF CONSULT ONLY    4. Breast cancer screening    - MA Screening Digital Bilateral; Future    5. Screening for human immunodeficiency virus    - HIV Antigen  "Antibody Combo    6. Screening examination for pulmonary tuberculosis    - Quantiferon TB Gold Plus    7. CARDIOVASCULAR SCREENING; LDL GOAL LESS THAN 160    - Lipid panel reflex to direct LDL Fasting    8. Family history of thyroid disease  TSH   Date Value Ref Range Status   2017 0.95 0.40 - 4.00 mU/L Final   2016 0.97 0.40 - 4.00 mU/L Final         9. Screening for diabetes mellitus (DM)    - Glucose    COUNSELING:  Reviewed preventive health counseling, as reflected in patient instructions  Special attention given to:        Regular exercise       Healthy diet/nutrition       Vision screening       Osteoporosis Prevention/Bone Health       HIV screeninx in teen years, 1x in adult years, and at intervals if high risk       (Elizabeth)menopause management       The 10-year ASCVD risk score (Rosemarie MALAVE Jr., et al., 2013) is: 0.9%    Values used to calculate the score:      Age: 54 years      Sex: Female      Is Non- : No      Diabetic: No      Tobacco smoker: No      Systolic Blood Pressure: 112 mmHg      Is BP treated: No      HDL Cholesterol: 94 mg/dL      Total Cholesterol: 194 mg/dL       Advance Care Planning    BP Readings from Last 1 Encounters:   19 112/75     Estimated body mass index is 25.56 kg/m  as calculated from the following:    Height as of this encounter: 1.727 m (5' 8\").    Weight as of this encounter: 76.2 kg (168 lb 1.6 oz).      Weight management plan: Discussed healthy diet and exercise guidelines     reports that she has quit smoking. Her smoking use included cigarettes. She has never used smokeless tobacco.      Counseling Resources:  ATP IV Guidelines  Pooled Cohorts Equation Calculator  Breast Cancer Risk Calculator  FRAX Risk Assessment  ICSI Preventive Guidelines  Dietary Guidelines for Americans,   [employer] MyPlate  ASA Prophylaxis  Lung CA Screening    Zahida Wiley MD  [facility]  Chart documentation done in part with " [employer] Voice recognition Software. Although reviewed after completion, some word and grammatical error may remain.

## 2019-04-29 LAB
GAMMA INTERFERON BACKGROUND BLD IA-ACNC: 0.08 IU/ML
HIV 1+2 AB+HIV1 P24 AG SERPL QL IA: NONREACTIVE
M TB IFN-G BLD-IMP: NEGATIVE
M TB IFN-G CD4+ BCKGRND COR BLD-ACNC: 9.08 IU/ML
MITOGEN IGNF BCKGRD COR BLD-ACNC: 0 IU/ML
MITOGEN IGNF BCKGRD COR BLD-ACNC: 0 IU/ML

## 2019-04-29 NOTE — RESULT ENCOUNTER NOTE
Dear Jemma,  Your lab test showed normal results for fasting blood sugar and slightly elevated total and LDL( BAD) cholesterol, which is not concerning given the elevated HDL cholesterol, this is good  Your test results for HIV and tuberculosis screening are both negative, this is good and reassuring.   Let me know if you have any questions. Take care.  Zahida Wiley MD

## 2019-05-11 ENCOUNTER — OFFICE VISIT (OUTPATIENT)
Dept: PEDIATRICS | Facility: CLINIC | Age: 54
End: 2019-05-11
Payer: COMMERCIAL

## 2019-05-11 VITALS
OXYGEN SATURATION: 100 % | DIASTOLIC BLOOD PRESSURE: 73 MMHG | SYSTOLIC BLOOD PRESSURE: 107 MMHG | WEIGHT: 170.1 LBS | HEART RATE: 71 BPM | BODY MASS INDEX: 25.86 KG/M2 | TEMPERATURE: 97.9 F

## 2019-05-11 DIAGNOSIS — Z12.4 SCREENING FOR CERVICAL CANCER: Primary | ICD-10-CM

## 2019-05-11 PROCEDURE — 87624 HPV HI-RISK TYP POOLED RSLT: CPT | Performed by: FAMILY MEDICINE

## 2019-05-11 PROCEDURE — G0145 SCR C/V CYTO,THINLAYER,RESCR: HCPCS | Performed by: FAMILY MEDICINE

## 2019-05-11 NOTE — PROGRESS NOTES
SUBJECTIVE:   Estela Rock is a 54 year old female who presents to clinic today for the following   health issues:      Concern - Pap only  Patient is here for pelvic exam and Pap which was not done at her physical on 4/26/19, due to patient being in menstruation          Additional history: as documented    Reviewed  and updated as needed this visit by clinical staff         Reviewed and updated as needed this visit by Provider         Patient Active Problem List   Diagnosis     Family history of thyroid disease     CARDIOVASCULAR SCREENING; LDL GOAL LESS THAN 160     Seasonal allergic rhinitis     Idiopathic urticaria     Family history of colon cancer, brother     Other fatigue     History of uterine fibroid     Tortuous colon     Past Surgical History:   Procedure Laterality Date     COLONOSCOPY WITH CO2 INSUFFLATION N/A 9/5/2017    Procedure: COLONOSCOPY WITH CO2 INSUFFLATION;  BMI: 24.93  Referring: Dr. Wiley  Diagnosis: Screening for colon cancer   Pharmacy: [facility] [id number];  Surgeon: Duane, William Charles, MD;  Location:  OR     LASIK       VASCULAR SURGERY      varicose veins       Social History     Tobacco Use     Smoking status: Former Smoker     Types: Cigarettes     Smokeless tobacco: Never Used   Substance Use Topics     Alcohol use: Yes     Family History   Problem Relation Age of Onset     Thyroid Disease Mother      Childhood Heart Disease Mother      Obesity Mother      Thyroid Disease Sister         with thyroid removed     Colon Cancer Brother          Current Outpatient Medications   Medication Sig Dispense Refill     Multiple Vitamins-Minerals (DAILY MULTIVITAMIN PO) Take 1 tablet by mouth daily       MAGNESIUM GLYCINATE PLUS PO Take 1 tablet by mouth daily       Omega-3 Fatty Acids (OMEGA-3 FISH OIL PO) Take 1,200 mg by mouth daily       VITAMIN D, CHOLECALCIFEROL, PO Take 5,000 Units by mouth daily       No Known Allergies  Recent Labs   Lab Test 04/26/19  5370  08/18/17  1108 01/22/16  0924   * 88 79    94 66   TRIG 92 60 65   ALT  --  21 22   CR  --  0.73 0.72   GFRESTIMATED  --  83 86   GFRESTBLACK  --  >90 >90  African American GFR Calc     POTASSIUM  --  4.4 4.0   TSH  --  0.95 0.97      BP Readings from Last 3 Encounters:   05/11/19 107/73   04/26/19 112/75   09/05/17 110/64    Wt Readings from Last 3 Encounters:   05/11/19 77.2 kg (170 lb 1.6 oz)   04/26/19 76.2 kg (168 lb 1.6 oz)   08/15/17 77.4 kg (170 lb 9.6 oz)                  Labs reviewed in EPIC    ROS:  CONSTITUTIONAL: NEGATIVE for fever, chills, change in weight  : as above    OBJECTIVE:     /73 (BP Location: Right arm, Patient Position: Chair, Cuff Size: Adult Regular)   Pulse 71   Temp 97.9  F (36.6  C) (Temporal)   Wt 77.2 kg (170 lb 1.6 oz)   LMP 04/23/2019   SpO2 100%   BMI 25.86 kg/m    Body mass index is 25.86 kg/m .  GENERAL: healthy, alert and no distress   (female): normal female external genitalia, normal urethral meatus, vaginal mucosa, normal cervix/adnexa/uterus without masses or discharge   (female): trace old menstrual blood noted    Diagnostic Test Results:  none     ASSESSMENT/PLAN:             1. Screening for cervical cancer    - Pap imaged thin layer screen with HPV - recommended age 30 - 65 years (select HPV order below)  - HPV High Risk Types DNA Cervical    Chart documentation done in part with Dragon Voice recognition Software. Although reviewed after completion, some word and grammatical error may remain.    See Patient Instructions    Zahida Wiley MD  [facility]

## 2019-05-15 LAB
COPATH REPORT: NORMAL
PAP: NORMAL

## 2019-05-16 LAB
FINAL DIAGNOSIS: NORMAL
HPV HR 12 DNA CVX QL NAA+PROBE: NEGATIVE
HPV16 DNA SPEC QL NAA+PROBE: NEGATIVE
HPV18 DNA SPEC QL NAA+PROBE: NEGATIVE
SPECIMEN DESCRIPTION: NORMAL
SPECIMEN SOURCE CVX/VAG CYTO: NORMAL

## 2019-06-14 ENCOUNTER — OFFICE VISIT (OUTPATIENT)
Dept: GASTROENTEROLOGY | Facility: CLINIC | Age: 54
End: 2019-06-14
Attending: FAMILY MEDICINE
Payer: COMMERCIAL

## 2019-06-14 VITALS
OXYGEN SATURATION: 94 % | HEART RATE: 80 BPM | SYSTOLIC BLOOD PRESSURE: 105 MMHG | WEIGHT: 169.9 LBS | HEIGHT: 68 IN | BODY MASS INDEX: 25.75 KG/M2 | DIASTOLIC BLOOD PRESSURE: 75 MMHG

## 2019-06-14 DIAGNOSIS — Z80.0 FAMILY HISTORY OF COLON CANCER: Primary | ICD-10-CM

## 2019-06-14 DIAGNOSIS — Q43.8 TORTUOUS COLON: ICD-10-CM

## 2019-06-14 PROCEDURE — 99204 OFFICE O/P NEW MOD 45 MIN: CPT | Performed by: INTERNAL MEDICINE

## 2019-06-14 ASSESSMENT — PAIN SCALES - GENERAL: PAINLEVEL: NO PAIN (0)

## 2019-06-14 ASSESSMENT — MIFFLIN-ST. JEOR: SCORE: 1414.16

## 2019-06-14 NOTE — NURSING NOTE
"Estela Rock's goals for this visit include:   Chief Complaint   Patient presents with     Consult     Family history of colon cancer, tortuous colon       She requests these members of her care team be copied on today's visit information: yes    PCP: Zahida Wiley    Referring Provider:  Zahida Wiley MD  [address] [place] [address]    /75   Pulse 80   Ht 1.727 m (5' 8\")   Wt 77.1 kg (169 lb 14.4 oz)   SpO2 94%   BMI 25.83 kg/m      Do you need any medication refills at today's visit? No    Jose Rafael Oreilly CMA      "

## 2019-06-14 NOTE — PROGRESS NOTES
GASTROENTEROLOGY NEW PATIENT CLINIC VISIT    CC/REFERRING MD:    Zahida Wiley    REASON FOR CONSULTATION:   Zahida Wiley for   Chief Complaint   Patient presents with     Consult     Family history of colon cancer, tortuous colon       HISTORY OF PRESENT ILLNESS:    Estela Rock is 54 year old female who presents for evaluation of colon cancer screening.  She notes that she has a family history of colon cancer in her brother age 57, grandparents in their 80s, uncle in her 70s, aunt in her 70s and possibly dead in his 70s.  2 years ago, she went for a screening colonoscopy and her colonoscopy was incomplete due to a tortuous colon.  She had a follow-up CT colonoscopy which noted 2 small polyps but no masses though the distention of the right colon was incomplete.  She reports that she was told to have a follow-up exam in 2 years time and so she presents today to discuss options.  She notes that she is not having any symptoms.  She has no abdominal pain, no rectal bleeding, no weight loss and no other concerns at this point.    PROBLEM LIST  Patient Active Problem List    Diagnosis Date Noted     Tortuous colon 04/26/2019     Priority: Medium     Family history of thyroid disease 01/15/2016     Priority: Medium     CARDIOVASCULAR SCREENING; LDL GOAL LESS THAN 160 01/15/2016     Priority: Medium     Seasonal allergic rhinitis 01/15/2016     Priority: Medium     Idiopathic urticaria 01/15/2016     Priority: Medium     Family history of colon cancer, brother 01/15/2016     Priority: Medium     Other fatigue 01/15/2016     Priority: Medium     History of uterine fibroid 01/15/2016     Priority: Medium       PERTINENT PAST MEDICAL HISTORY:  (I personally reviewed this history with the patient at today's visit)   Past Medical History:   Diagnosis Date     NO ACTIVE PROBLEMS          PREVIOUS SURGERIES: (I personally reviewed this history with the patient at today's visit)   Past  Surgical History:   Procedure Laterality Date     COLONOSCOPY WITH CO2 INSUFFLATION N/A 9/5/2017    Procedure: COLONOSCOPY WITH CO2 INSUFFLATION;  BMI: 24.93  Referring: Dr. Wiley  Diagnosis: Screening for colon cancer   Pharmacy: [facility] [phone];  Surgeon: Duane, William Charles, MD;  Location: [facility] ER       VASCULAR SURGERY      varicose veins         ALLERGIES:   No Known Allergies    PERTINENT MEDICATIONS:    Current Outpatient Medications:      MAGNESIUM GLYCINATE PLUS PO, Take 1 tablet by mouth daily, Disp: , Rfl:      Multiple Vitamins-Minerals (DAILY MULTIVITAMIN PO), Take 1 tablet by mouth daily, Disp: , Rfl:      Omega-3 Fatty Acids (OMEGA-3 FISH OIL PO), Take 1,200 mg by mouth daily, Disp: , Rfl:      VITAMIN D, CHOLECALCIFEROL, PO, Take 5,000 Units by mouth daily, Disp: , Rfl:     SOCIAL HISTORY:  Social History     Socioeconomic History     Marital status:      Spouse name: Not on file     Number of children: Not on file     Years of education: Not on file     Highest education level: Not on file   Occupational History     Not on file   Social Needs     Financial resource strain: Not on file     Food insecurity:     Worry: Not on file     Inability: Not on file     Transportation needs:     Medical: Not on file     Non-medical: Not on file   Tobacco Use     Smoking status: Former Smoker     Types: Cigarettes     Smokeless tobacco: Never Used   Substance and Sexual Activity     Alcohol use: Yes     Drug use: No     Sexual activity: Yes     Partners: Male   Lifestyle     Physical activity:     Days per week: Not on file     Minutes per session: Not on file     Stress: Not on file   Relationships     Social connections:     Talks on phone: Not on file     Gets together: Not on file     Attends [employer] service: Not on file     Active member of club or organization: Not on file     Attends meetings of clubs or organizations: Not on file     Relationship status: Not on file      "Intimate partner violence:     Fear of current or ex partner: Not on file     Emotionally abused: Not on file     Physically abused: Not on file     Forced sexual activity: Not on file   Other Topics Concern     Parent/sibling w/ CABG, MI or angioplasty before 65F 55M? No   Social History Narrative     Not on file       FAMILY HISTORY: (I personally reviewed this history with the patient at today's visit)  Family History   Problem Relation Age of Onset     Thyroid Disease Mother      Childhood Heart Disease Mother      Obesity Mother      Thyroid Disease Sister         with thyroid removed     Colon Cancer Brother         ROS:    No fevers or chills  No weight loss  No blurry vision, double vision or change in vision  No sore throat  No lymphadenopathy  No headache, paraesthesias, or weakness in a limb  No shortness of breath or wheezing  No chest pain or pressure  No arthralgias or myalgias  No rashes or skin changes  No odynophagia or dysphagia  No BRBPR, hematochezia, melena  No dysuria, frequency or urgency  No hot/cold intolerance or polyria  No anxiety or depression  PHYSICAL EXAMINATION:  Constitutional: aaox3, cooperative, pleasant, not dyspneic/diaphoretic, no acute distress  Vitals reviewed: /75   Pulse 80   Ht 1.727 m (5' 8\")   Wt 77.1 kg (169 lb 14.4 oz)   SpO2 94%   BMI 25.83 kg/m    Wt:   Wt Readings from Last 2 Encounters:   06/14/19 77.1 kg (169 lb 14.4 oz)   05/11/19 77.2 kg (170 lb 1.6 oz)      Eyes: Sclera anicteric/injected  Ears/nose/mouth/throat: Normal oropharynx without ulcers or exudate, mucus membranes moist, hearing intact  Neck: supple, thyroid normal size  CV: No edema, RRR  Respiratory: Unlabored breathing, CTAB  Lymph: No submandibular, supraclavicular or inguinal lymphadenopathy  Abd: Nondistended, no masses, +bs, no hepatosplenomegaly, nontender, no peritoneal signs  Skin: warm, perfused, no jaundice  Psych: Normal affect  MSK: Normal gait      PERTINENT STUDIES: (I " personally reviewed these laboratory studies today)  Most recent CBC:   Recent Labs   Lab Test 17  1108 16  0924   WBC 6.0 6.1   HGB 13.5 13.5   HCT 39.3 39.1    225     Most recent hepatic panel:  Recent Labs   Lab Test 17  1108 16  0924   ALT 21 22   AST 12 8     Most recent creatinine:  Recent Labs   Lab Test 178 16  0924   CR 0.73 0.72       RADIOLOGY:      Examination:  CT COLONOGRAPHY SCREENING 10/31/2017 11:02 AM      Comparison: None.     History: Incomplete colonoscopy; right: Not examined     Technique: CT of the abdomen and pelvis was performed utilizing a CT  colonography protocol without intravenous contrast. CO2 was instilled  into the rectum and supine/prone CT imaging was obtained.  3-D  reconstructions were created by the radiologist and viewed on a  separate [employer] workstation.     Exam quality:   The exam is of diagnostic quality with inadequate  colonic prep at the proximal ascending colon approximately 115 cm from  rectum with inadequate colonic distention ; otherwise colonic prep and  distention adequate.     Findings:   This examination is designed for detection of polyps or masses greater  than 6 mm. Small polyps are not excluded by this exam.     Colon:   Several small (less than 6 mm) polyps, includin millimeter sessile polyp in the distal left colon (series 3, image  356 and series 7, image 344).     4 mm sessile polyp arises from a fold in the mid left colon (series 3,  image 296 and series 7, image 280).     No worrisome chronic mass or polyp.      No bowel obstruction. Normal appendix. Scattered distal colon  diverticulosis without CT evidence of diverticulitis.     Additional incidental findings on limited noncontrast low dose exam:  Normal noncontrast appearance of the liver, gallbladder and spleen  Exophytic left renal cyst. Subcentimeter apparent gross fat containing  right renal lesion, potentially an AML. Negative adrenal  glands.  Pancreas is unremarkable. No abdominal aortic aneurysm. No abdominal  pelvic adenopathy. Normal uterine appearance. No adnexal mass.                                                                      Impression:  1. No worrisome colonic polyp or mass is identified; noting a short  segment of the ascending colon was not well evaluated owing to  incomplete distention.  2. Scattered colonic diverticulosis with no CT evidence of  diverticulitis.     I have personally reviewed the examination and initial interpretation  and I agree with the findings.     CHENG ESCOBEDO MD    ASSESSMENT/PLAN:    Estela Rock is a 54 year old female who presents for evaluation of colon cancer screening.  She is at elevated risk of colon cancer with a first-degree relative under age 60 having colon cancer and multiple second-degree relatives having colon cancer.  She has not had any thorough evaluation of her right colon by either prior colonoscopy or on her CT colonography, thus it is reasonable to pursue additional evaluation at this time.  We discussed the option of scheduling a colonoscopy with anesthesia which is generally successful in order to complete the colonoscopy versus repeating a CT colonoscopy though we note that if polyps were found, she would likely require a colonoscopy anyway.  We also discussed stool based testing though this is not a good idea for her given she is at elevated risk of colon cancer.  She has opted to schedule a colonoscopy with MAC which is very reasonable.  We did discuss that timing interval of her next colonoscopy will depend on the findings of that exam but she should never extend surveillance interval beyond 5 years due to family history.      Family history of colon cancer      RTC PRN    Thank you for this consultation.  It was a pleasure to participate in the care of this patient; please contact us with any further questions.      This note was created with voice recognition  software, and while reviewed for accuracy, typos may remain.     Duy Frey MD  Adjunct  of Medicine  Division of Gastroenterology, Hepatology and Nutrition  [facility]  [phone]

## 2019-07-31 ENCOUNTER — TELEPHONE (OUTPATIENT)
Dept: PEDIATRICS | Facility: CLINIC | Age: 54
End: 2019-07-31

## 2019-07-31 ENCOUNTER — OFFICE VISIT (OUTPATIENT)
Dept: PEDIATRICS | Facility: CLINIC | Age: 54
End: 2019-07-31
Payer: COMMERCIAL

## 2019-07-31 VITALS
DIASTOLIC BLOOD PRESSURE: 69 MMHG | BODY MASS INDEX: 26.07 KG/M2 | HEART RATE: 72 BPM | HEIGHT: 68 IN | WEIGHT: 172 LBS | OXYGEN SATURATION: 100 % | TEMPERATURE: 98.1 F | SYSTOLIC BLOOD PRESSURE: 108 MMHG

## 2019-07-31 DIAGNOSIS — Z01.818 PREOP GENERAL PHYSICAL EXAM: Primary | ICD-10-CM

## 2019-07-31 DIAGNOSIS — J34.2 DNS (DEVIATED NASAL SEPTUM): ICD-10-CM

## 2019-07-31 DIAGNOSIS — Z12.11 COLON CANCER SCREENING: ICD-10-CM

## 2019-07-31 DIAGNOSIS — Z80.0 FAMILY HISTORY OF COLON CANCER: ICD-10-CM

## 2019-07-31 DIAGNOSIS — Q43.8 TORTUOUS COLON: ICD-10-CM

## 2019-07-31 PROCEDURE — 93000 ELECTROCARDIOGRAM COMPLETE: CPT | Performed by: FAMILY MEDICINE

## 2019-07-31 PROCEDURE — 99214 OFFICE O/P EST MOD 30 MIN: CPT | Performed by: FAMILY MEDICINE

## 2019-07-31 ASSESSMENT — MIFFLIN-ST. JEOR
SCORE: 1414.5
SCORE: 1423.69

## 2019-07-31 ASSESSMENT — PAIN SCALES - GENERAL: PAINLEVEL: NO PAIN (0)

## 2019-07-31 NOTE — PATIENT INSTRUCTIONS
Get the labs today  Hold omega 3 fish oil 1-2 weeks before surgery    Before Your Surgery      Call your surgeon if there is any change in your health. This includes signs of a cold or flu (such as a sore throat, runny nose, cough, rash or fever).    Do not smoke, drink alcohol or take over the counter medicine (unless your surgeon or primary care doctor tells you to) for the 24 hours before and after surgery.    If you take prescribed drugs: Follow your doctor s orders about which medicines to take and which to stop until after surgery.    Eating and drinking prior to surgery: follow the instructions from your surgeon    Take a shower or bath the night before surgery. Use the soap your surgeon gave you to gently clean your skin. If you do not have soap from your surgeon, use your regular soap. Do not shave or scrub the surgery site.  Wear clean pajamas and have clean sheets on your bed.

## 2019-07-31 NOTE — PROGRESS NOTES
[address] [address]  [phone]  Dept: [phone]    PRE-OP EVALUATION:  Today's date: 2019    Estela Rock (: 1965) presents for pre-operative evaluation assessment as requested by Dr. Morales.  She requires evaluation and anesthesia risk assessment prior to undergoing surgery/procedure for treatment of deviated nasal septum with  septoplasty and for colonoscopy for tortuous colon and colon cancer screening    Fax number for surgical facility: available electronically  Primary Physician: Zahida Wiley  Type of Anesthesia Anticipated: MAC    Patient has a Health Care Directive or Living Will:  YES     Preop Questions 2019   Who is doing your surgery? Dr. Shadi Morales   What are you having done? Colonoscopy on  Dr morales- septoplasty and sinus surgery on    Date of Surgery/Procedure: / colonoscopy,  septoplasty and sinus surgery   Facility or Hospital where procedure/surgery will be performed: Colonoscopy- sudeep [facility]. Septoplasy at [facility] with  from [facility]   1.  Do you have a history of Heart attack, stroke, stent, coronary bypass surgery, or other heart surgery? No   2.  Do you ever have any pain or discomfort in your chest? No   3.  Do you have a history of  Heart Failure? No   4.   Are you troubled by shortness of breath when:  walking on a level surface, or up a slight hill, or at night? No   5.  Do you currently have a cold, bronchitis or other respiratory infection? No   6.  Do you have a cough, shortness of breath, or wheezing? No   7.  Do you sometimes get pains in the calves of your legs when you walk? No   8. Do you or anyone in your family have previous history of blood clots? No   9.  Do you or does anyone in your family have a serious bleeding problem such as prolonged bleeding following surgeries or cuts? No   10. Have you ever had problems  with anemia or been told to take iron pills? No   11. Have you had any abnormal blood loss such as black, tarry or bloody stools, or abnormal vaginal bleeding? No   12. Have you ever had a blood transfusion? No   13. Have you or any of your relatives ever had problems with anesthesia? No   14. Do you have sleep apnea, excessive snoring or daytime drowsiness? No   15. Do you have any prosthetic heart valves? No   16. Do you have prosthetic joints? No   17. Is there any chance that you may be pregnant? No         HPI:     HPI related to upcoming procedure: Patient with no significant past medical history other than deviated nasal septum, family history of colon cancer and transverse colon is here for preop clearance for upcoming colonoscopy under anesthesia due to tortuous colon and septoplasty for deviated nasal septum      See problem list for active medical problems.  Problems all longstanding and stable, except as noted/documented.  See ROS for pertinent symptoms related to these conditions.      MEDICAL HISTORY:     Patient Active Problem List    Diagnosis Date Noted     Tortuous colon 04/26/2019     Priority: Medium     Family history of thyroid disease 01/15/2016     Priority: Medium     CARDIOVASCULAR SCREENING; LDL GOAL LESS THAN 160 01/15/2016     Priority: Medium     Seasonal allergic rhinitis 01/15/2016     Priority: Medium     Idiopathic urticaria 01/15/2016     Priority: Medium     Family history of colon cancer, brother 01/15/2016     Priority: Medium     Other fatigue 01/15/2016     Priority: Medium     History of uterine fibroid 01/15/2016     Priority: Medium      Past Medical History:   Diagnosis Date     NO ACTIVE PROBLEMS      Past Surgical History:   Procedure Laterality Date     COLONOSCOPY WITH CO2 INSUFFLATION N/A 9/5/2017    Procedure: COLONOSCOPY WITH CO2 INSUFFLATION;  BMI: 24.93  Referring: Dr. Wiley  Diagnosis: Screening for colon cancer   Pharmacy: [facility] [phone];  Surgeon:  "Duane, William Charles, MD;  Location:  OR     LASIK       VASCULAR SURGERY      varicose veins     Current Outpatient Medications   Medication Sig Dispense Refill     MAGNESIUM GLYCINATE PLUS PO Take 1 tablet by mouth daily       Multiple Vitamins-Minerals (DAILY MULTIVITAMIN PO) Take 1 tablet by mouth daily       Omega-3 Fatty Acids (OMEGA-3 FISH OIL PO) Take 1,200 mg by mouth daily       VITAMIN D, CHOLECALCIFEROL, PO Take 5,000 Units by mouth daily       OTC products: None, except as noted above    No Known Allergies   Latex Allergy: NO    Social History     Tobacco Use     Smoking status: Former Smoker     Types: Cigarettes     Smokeless tobacco: Never Used   Substance Use Topics     Alcohol use: Yes     Comment: 1-2 drinks per week      History   Drug Use No       REVIEW OF SYSTEMS:   CONSTITUTIONAL: NEGATIVE for fever, chills, change in weight  INTEGUMENTARY/SKIN: NEGATIVE for worrisome rashes, moles or lesions  EYES: NEGATIVE for vision changes or irritation  ENT/MOUTH: Deviated nasal septum  RESP: NEGATIVE for significant cough or SOB  BREAST: NEGATIVE for masses, tenderness or discharge  CV: NEGATIVE for chest pain, palpitations or peripheral edema  GI: NEGATIVE for nausea, abdominal pain, heartburn, or change in bowel habits  GI: Tortuous colon  : NEGATIVE for frequency, dysuria, or hematuria  MUSCULOSKELETAL: NEGATIVE for significant arthralgias or myalgia  NEURO: NEGATIVE for weakness, dizziness or paresthesias  ENDOCRINE: NEGATIVE for temperature intolerance, skin/hair changes  HEME: NEGATIVE for bleeding problems  PSYCHIATRIC: NEGATIVE for changes in mood or affect    EXAM:   /69 (BP Location: Right arm, Patient Position: Sitting, Cuff Size: Adult Regular)   Pulse 72   Temp 98.1  F (36.7  C) (Oral)   Ht 1.727 m (5' 8\")   Wt 78 kg (172 lb)   LMP 06/30/2019 (Approximate)   SpO2 100%   BMI 26.15 kg/m      GENERAL APPEARANCE: healthy, alert and no distress     EYES: EOMI, PERRL     HENT: " ear canals and TM's normal and nose and mouth without ulcers or lesions     NECK: no adenopathy, no asymmetry, masses, or scars and thyroid normal to palpation     RESP: lungs clear to auscultation - no rales, rhonchi or wheezes     CV: regular rates and rhythm, normal S1 S2, no S3 or S4 and no murmur, click or rub     ABDOMEN:  soft, nontender, no HSM or masses and bowel sounds normal     MS: extremities normal- no gross deformities noted, no evidence of inflammation in joints, FROM in all extremities.     SKIN: no suspicious lesions or rashes     NEURO: Normal strength and tone, sensory exam grossly normal, mentation intact and speech normal     PSYCH: mentation appears normal. and affect normal/bright     LYMPHATICS: No cervical adenopathy    DIAGNOSTICS:   EKG: appears normal, NSR, normal axis, normal intervals, no acute ST/T changes c/w ischemia, no LVH by voltage criteria, unchanged from previous tracings  Pending CBC    Recent Labs   Lab Test 08/18/17  1108 01/22/16  0924   HGB 13.5 13.5    225    140   POTASSIUM 4.4 4.0   CR 0.73 0.72      ASSESSMENT / PLAN:  (Z01.818) Preop general physical exam  (primary encounter diagnosis)  Comment:   Plan: EKG 12-lead complete w/read - Clinics, CBC with        platelets and differential        Patient is cleared for the procedure, recommended to hold omega-3 fish oil for 1 week before the procedure    (Q43.8) Tortuous colon  Comment:   Plan: EKG 12-lead complete w/read - Clinics, CBC with        platelets and differential        as above      (Z12.11) Colon cancer screening  Comment:   Plan: EKG 12-lead complete w/read - Clinics, CBC with        platelets and differential        as above      (J34.2) DNS (deviated nasal septum)  Comment:   Plan: EKG 12-lead complete w/read - Clinics, CBC with        platelets and differential        as above        IMPRESSION:   Reason for surgery/procedure: Tortuous colon, colon cancer screening, family history of colon  cancer deviated nasal septum/colonoscopy and septoplasty  Diagnosis/reason for consult: Preoperative clearance    The proposed surgical procedure is considered INTERMEDIATE risk.    REVISED CARDIAC RISK INDEX  The patient has the following serious cardiovascular risks for perioperative complications such as (MI, PE, VFib and 3  AV Block):  No serious cardiac risks  INTERPRETATION: 0 risks: Class I (very low risk - 0.4% complication rate)    The patient has the following additional risks for perioperative complications:  No identified additional risks      ICD-10-CM    1. Preop general physical exam Z01.818 EKG 12-lead complete w/read - Clinics     CBC with platelets and differential     CANCELED: CBC with platelets and differential   2. Tortuous colon Q43.8 EKG 12-lead complete w/read - Clinics     CBC with platelets and differential     CANCELED: CBC with platelets and differential   3. Colon cancer screening Z12.11 EKG 12-lead complete w/read - Clinics     CANCELED: CBC with platelets and differential   4. DNS (deviated nasal septum) J34.2 EKG 12-lead complete w/read - Clinics     CBC with platelets and differential     CANCELED: CBC with platelets and differential   5. Family history of colon cancer Z80.0 EKG 12-lead complete w/read - Clinics     CANCELED: CBC with platelets and differential       RECOMMENDATIONS:         --Patient is to take all scheduled medications on the day of surgery EXCEPT for modifications listed below.    APPROVAL GIVEN to proceed with proposed procedure, without further diagnostic evaluation       Signed Electronically by: Zahida Wiley MD    Copy of this evaluation report is provided to requesting physician.    [facility] Preop Guidelines    Revised Cardiac Risk Index  Chart documentation done in part with Dragon Voice recognition Software. Although reviewed after completion, some word and grammatical error may remain.

## 2019-07-31 NOTE — TELEPHONE ENCOUNTER
Reviewed the note from lab as mentioned below   please inform patient to get the labs for her preop, that was ordered today

## 2019-07-31 NOTE — TELEPHONE ENCOUNTER
----- Message from Chantel Calderon sent at 7/31/2019  5:50 PM CDT -----  Regarding: LABS  Patient did not show up for labs. They were reordered for future.  Thanks,   Chantel BROWN

## 2019-08-01 DIAGNOSIS — Z01.818 PREOP GENERAL PHYSICAL EXAM: ICD-10-CM

## 2019-08-01 DIAGNOSIS — Q43.8 TORTUOUS COLON: ICD-10-CM

## 2019-08-01 DIAGNOSIS — J34.2 DNS (DEVIATED NASAL SEPTUM): ICD-10-CM

## 2019-08-01 LAB
BASOPHILS # BLD AUTO: 0.1 10E9/L (ref 0–0.2)
BASOPHILS NFR BLD AUTO: 0.9 %
DIFFERENTIAL METHOD BLD: NORMAL
EOSINOPHIL # BLD AUTO: 0.4 10E9/L (ref 0–0.7)
EOSINOPHIL NFR BLD AUTO: 7.9 %
ERYTHROCYTE [DISTWIDTH] IN BLOOD BY AUTOMATED COUNT: 13.2 % (ref 10–15)
HCT VFR BLD AUTO: 39.7 % (ref 35–47)
HGB BLD-MCNC: 13.2 G/DL (ref 11.7–15.7)
IMM GRANULOCYTES # BLD: 0 10E9/L (ref 0–0.4)
IMM GRANULOCYTES NFR BLD: 0.5 %
LYMPHOCYTES # BLD AUTO: 2.3 10E9/L (ref 0.8–5.3)
LYMPHOCYTES NFR BLD AUTO: 41.9 %
MCH RBC QN AUTO: 31.1 PG (ref 26.5–33)
MCHC RBC AUTO-ENTMCNC: 33.2 G/DL (ref 31.5–36.5)
MCV RBC AUTO: 93 FL (ref 78–100)
MONOCYTES # BLD AUTO: 0.5 10E9/L (ref 0–1.3)
MONOCYTES NFR BLD AUTO: 8.1 %
NEUTROPHILS # BLD AUTO: 2.3 10E9/L (ref 1.6–8.3)
NEUTROPHILS NFR BLD AUTO: 40.7 %
PLATELET # BLD AUTO: 218 10E9/L (ref 150–450)
RBC # BLD AUTO: 4.25 10E12/L (ref 3.8–5.2)
WBC # BLD AUTO: 5.6 10E9/L (ref 4–11)

## 2019-08-01 PROCEDURE — 36415 COLL VENOUS BLD VENIPUNCTURE: CPT | Performed by: FAMILY MEDICINE

## 2019-08-01 PROCEDURE — 85025 COMPLETE CBC W/AUTO DIFF WBC: CPT | Performed by: FAMILY MEDICINE

## 2019-08-01 NOTE — TELEPHONE ENCOUNTER
Called patient, relayed message & patient verbalized understanding. She will come into lab today.   Bea Lauren RN

## 2019-08-02 ENCOUNTER — ANESTHESIA EVENT (OUTPATIENT)
Dept: SURGERY | Facility: AMBULATORY SURGERY CENTER | Age: 54
End: 2019-08-02

## 2019-08-02 ENCOUNTER — SURGERY (OUTPATIENT)
Age: 54
End: 2019-08-02
Payer: COMMERCIAL

## 2019-08-02 ENCOUNTER — ANESTHESIA (OUTPATIENT)
Dept: SURGERY | Facility: AMBULATORY SURGERY CENTER | Age: 54
End: 2019-08-02
Payer: COMMERCIAL

## 2019-08-02 ENCOUNTER — HOSPITAL ENCOUNTER (OUTPATIENT)
Facility: AMBULATORY SURGERY CENTER | Age: 54
Discharge: HOME | End: 2019-08-02
Attending: INTERNAL MEDICINE | Admitting: INTERNAL MEDICINE
Payer: COMMERCIAL

## 2019-08-02 VITALS
BODY MASS INDEX: 25.76 KG/M2 | RESPIRATION RATE: 16 BRPM | HEIGHT: 68 IN | WEIGHT: 169.97 LBS | SYSTOLIC BLOOD PRESSURE: 92 MMHG | TEMPERATURE: 98.1 F | HEART RATE: 73 BPM | DIASTOLIC BLOOD PRESSURE: 62 MMHG | OXYGEN SATURATION: 99 %

## 2019-08-02 VITALS — HEART RATE: 67 BPM

## 2019-08-02 LAB
COLONOSCOPY: NORMAL
HCG SERPL QL: NEGATIVE

## 2019-08-02 PROCEDURE — 88305 TISSUE EXAM BY PATHOLOGIST: CPT | Performed by: INTERNAL MEDICINE

## 2019-08-02 PROCEDURE — 45381 COLONOSCOPY SUBMUCOUS NJX: CPT | Mod: 51 | Performed by: INTERNAL MEDICINE

## 2019-08-02 PROCEDURE — G8907 PT DOC NO EVENTS ON DISCHARG: HCPCS

## 2019-08-02 PROCEDURE — 84703 CHORIONIC GONADOTROPIN ASSAY: CPT | Performed by: ANESTHESIOLOGY

## 2019-08-02 PROCEDURE — 45381 COLONOSCOPY SUBMUCOUS NJX: CPT

## 2019-08-02 PROCEDURE — G8918 PT W/O PREOP ORDER IV AB PRO: HCPCS

## 2019-08-02 PROCEDURE — 45385 COLONOSCOPY W/LESION REMOVAL: CPT

## 2019-08-02 PROCEDURE — 45385 COLONOSCOPY W/LESION REMOVAL: CPT | Mod: 33 | Performed by: INTERNAL MEDICINE

## 2019-08-02 RX ORDER — LIDOCAINE HYDROCHLORIDE 20 MG/ML
INJECTION, SOLUTION INFILTRATION; PERINEURAL PRN
Status: DISCONTINUED | OUTPATIENT
Start: 2019-08-02 | End: 2019-08-02

## 2019-08-02 RX ORDER — ONDANSETRON 2 MG/ML
4 INJECTION INTRAMUSCULAR; INTRAVENOUS
Status: DISCONTINUED | OUTPATIENT
Start: 2019-08-02 | End: 2019-08-03 | Stop reason: HOSPADM

## 2019-08-02 RX ORDER — PROPOFOL 10 MG/ML
INJECTION, EMULSION INTRAVENOUS PRN
Status: DISCONTINUED | OUTPATIENT
Start: 2019-08-02 | End: 2019-08-02

## 2019-08-02 RX ORDER — LIDOCAINE 40 MG/G
CREAM TOPICAL
Status: DISCONTINUED | OUTPATIENT
Start: 2019-08-02 | End: 2019-08-03 | Stop reason: HOSPADM

## 2019-08-02 RX ORDER — SODIUM CHLORIDE, SODIUM LACTATE, POTASSIUM CHLORIDE, CALCIUM CHLORIDE 600; 310; 30; 20 MG/100ML; MG/100ML; MG/100ML; MG/100ML
INJECTION, SOLUTION INTRAVENOUS CONTINUOUS
Status: DISCONTINUED | OUTPATIENT
Start: 2019-08-02 | End: 2019-08-03 | Stop reason: HOSPADM

## 2019-08-02 RX ADMIN — PROPOFOL 50 MG: 10 INJECTION, EMULSION INTRAVENOUS at 12:44

## 2019-08-02 RX ADMIN — PROPOFOL 30 MG: 10 INJECTION, EMULSION INTRAVENOUS at 13:13

## 2019-08-02 RX ADMIN — LIDOCAINE HYDROCHLORIDE 60 MG: 20 INJECTION, SOLUTION INFILTRATION; PERINEURAL at 12:44

## 2019-08-02 RX ADMIN — PROPOFOL 30 MG: 10 INJECTION, EMULSION INTRAVENOUS at 13:10

## 2019-08-02 RX ADMIN — PROPOFOL 30 MG: 10 INJECTION, EMULSION INTRAVENOUS at 12:51

## 2019-08-02 RX ADMIN — PROPOFOL 30 MG: 10 INJECTION, EMULSION INTRAVENOUS at 13:07

## 2019-08-02 RX ADMIN — PROPOFOL 30 MG: 10 INJECTION, EMULSION INTRAVENOUS at 13:00

## 2019-08-02 RX ADMIN — PROPOFOL 30 MG: 10 INJECTION, EMULSION INTRAVENOUS at 13:02

## 2019-08-02 RX ADMIN — PROPOFOL 30 MG: 10 INJECTION, EMULSION INTRAVENOUS at 12:53

## 2019-08-02 RX ADMIN — PROPOFOL 30 MG: 10 INJECTION, EMULSION INTRAVENOUS at 12:57

## 2019-08-02 RX ADMIN — SODIUM CHLORIDE, SODIUM LACTATE, POTASSIUM CHLORIDE, CALCIUM CHLORIDE: 600; 310; 30; 20 INJECTION, SOLUTION INTRAVENOUS at 12:00

## 2019-08-02 RX ADMIN — PROPOFOL 30 MG: 10 INJECTION, EMULSION INTRAVENOUS at 12:47

## 2019-08-02 RX ADMIN — PROPOFOL 20 MG: 10 INJECTION, EMULSION INTRAVENOUS at 13:05

## 2019-08-02 RX ADMIN — PROPOFOL 30 MG: 10 INJECTION, EMULSION INTRAVENOUS at 13:16

## 2019-08-02 RX ADMIN — PROPOFOL 20 MG: 10 INJECTION, EMULSION INTRAVENOUS at 12:54

## 2019-08-02 RX ADMIN — PROPOFOL 30 MG: 10 INJECTION, EMULSION INTRAVENOUS at 12:49

## 2019-08-02 NOTE — ANESTHESIA POSTPROCEDURE EVALUATION
Anesthesia POST Procedure Evaluation    Patient: Estela Rock   MRN:     [id number] Gender:   female   Age:    54 year old :      1965        Preoperative Diagnosis: Screening Colonoscopy MAC -[facility] -   Procedure(s):  COLONOSCOPY, WITH CO2 INSUFFLATION  Colonoscopy, Flexible, With Lesion Removal Using Snare  Tattooing, With Colonoscopy   Postop Comments: No value filed.       Anesthesia Type:  No value filed.  MAC    Reportable Event: NO     PAIN: Uncomplicated   Sign Out status: Comfortable, Well controlled pain     PONV: No PONV   Sign Out status:  No Nausea or Vomiting     Neuro/Psych: Uneventful perioperative course   Sign Out Status: Preoperative baseline; Age appropriate mentation     Airway/Resp.: Uneventful perioperative course   Sign Out Status: Non labored breathing, age appropriate RR; Resp. Status within EXPECTED Parameters     CV: Uneventful perioperative course   Sign Out status: Appropriate BP and perfusion indices; Appropriate HR/Rhythm     Disposition:   Sign Out in:  PACU  Disposition:  Phase II; Home  Recovery Course: Uneventful  Follow-Up: Not required           Last Anesthesia Record Vitals:  CRNA VITALS  2019 1256 - 2019 1356      2019             SpO2:  100 %          Last PACU Vitals:  Vitals Value Taken Time   BP     Temp     Pulse 67 2019  1:22 PM   Resp     SpO2     Temp src     NIBP 94/60 2019  1:22 PM   Pulse 68 2019  1:22 PM   SpO2 100 % 2019  1:26 PM   Resp     Temp     Ht Rate 66 2019  1:25 PM   Temp 2           Electronically Signed By: Syed Matthews MD, 2019, 3:05 PM

## 2019-08-02 NOTE — ANESTHESIA CARE TRANSFER NOTE
Patient: Estela Rock    Procedure(s):  COLONOSCOPY, WITH CO2 INSUFFLATION  Colonoscopy, Flexible, With Lesion Removal Using Snare    Diagnosis: Screening Colonoscopy MAC -[facility] -  Diagnosis Additional Information: No value filed.    Anesthesia Type:   No value filed.     Note:  Airway :Room Air  Patient transferred to:PACU  Comments: Patient awake, alert, and oriented. SpO2 98%.  No apparent anesthesia complications. Handoff Report: Identifed the Patient, Identified the Reponsible Provider, Reviewed the pertinent medical history, Discussed the surgical course, Reviewed Intra-OP anesthesia mangement and issues during anesthesia, Set expectations for post-procedure period and Allowed opportunity for questions and acknowledgement of understanding      Vitals: (Last set prior to Anesthesia Care Transfer)    CRNA VITALS  8/2/2019 1256 - 8/2/2019 1329      8/2/2019             SpO2:  100 %                Electronically Signed By: EMILY Lopez CRNA  August 2, 2019  1:29 PM

## 2019-08-02 NOTE — ANESTHESIA PREPROCEDURE EVALUATION
Anesthesia Pre-Procedure Evaluation    Patient: Estela Rock   MRN:     [id number] Gender:   female   Age:    54 year old :      1965        Preoperative Diagnosis: [place] [facility] [place]   Procedure(s):  COLONOSCOPY, WITH CO2 INSUFFLATION  Colonoscopy, Flexible, With Lesion Removal Using Snare  Tattooing, With Colonoscopy     Past Medical History:   Diagnosis Date     NO ACTIVE PROBLEMS       Past Surgical History:   Procedure Laterality Date     COLONOSCOPY WITH CO2 INSUFFLATION N/A 2017    Procedure: COLONOSCOPY WITH CO2 INSUFFLATION;  BMI: 24.93  Referring: Dr. Wiley  Diagnosis: Screening for colon cancer   Pharmacy: [facility] [id number];  Surgeon: Duane, William Charles, MD;  Location:  OR     [facility]       VASCULAR SURGERY      varicose veins          Anesthesia Evaluation     .             ROS/MED HX    ENT/Pulmonary:  - neg pulmonary ROS   (+)allergic rhinitis, , . .    Neurologic:  - neg neurologic ROS     Cardiovascular:  - neg cardiovascular ROS       METS/Exercise Tolerance:     Hematologic: Comments: Idiopathic urticaria - neg hematologic  ROS       Musculoskeletal:  - neg musculoskeletal ROS       GI/Hepatic:  - neg GI/hepatic ROS       Renal/Genitourinary:  - ROS Renal section negative       Endo:  - neg endo ROS       Psychiatric:  - neg psychiatric ROS       Infectious Disease:  - neg infectious disease ROS       Malignancy:      - no malignancy   Other:    - neg other ROS                     PHYSICAL EXAM:   Mental Status/Neuro: A/A/O   Airway: Facies: Feasible  Mallampati: II  Mouth/Opening: Full  TM distance: > 6 cm  Neck ROM: Full   Respiratory: Auscultation: CTAB     Resp. Rate: Normal     Resp. Effort: Normal      CV: Rhythm: Regular  Rate: Age appropriate  Heart: Normal Sounds  Edema: None   Comments:      Dental: Normal Dentition                LABS:  CBC:   Lab Results   Component Value Date    WBC 5.6 2019    WBC 6.0 2017    HGB  "13.2 08/01/2019    HGB 13.5 08/18/2017    HCT 39.7 08/01/2019    HCT 39.3 08/18/2017     08/01/2019     08/18/2017     BMP:   Lab Results   Component Value Date     08/18/2017     01/22/2016    POTASSIUM 4.4 08/18/2017    POTASSIUM 4.0 01/22/2016    CHLORIDE 107 08/18/2017    CHLORIDE 107 01/22/2016    CO2 28 08/18/2017    CO2 27 01/22/2016    BUN 18 08/18/2017    BUN 20 01/22/2016    CR 0.73 08/18/2017    CR 0.72 01/22/2016    GLC 93 04/26/2019    GLC 86 08/18/2017     COAGS: No results found for: PTT, INR, FIBR  POC:   Lab Results   Component Value Date    HCGS Negative 08/02/2019     OTHER:   Lab Results   Component Value Date    MONISHA 9.1 08/18/2017    ALBUMIN 4.4 08/18/2017    PROTTOTAL 7.8 08/18/2017    ALT 21 08/18/2017    AST 12 08/18/2017    ALKPHOS 43 08/18/2017    BILITOTAL 0.6 08/18/2017    TSH 0.95 08/18/2017        Preop Vitals    BP Readings from Last 3 Encounters:   08/02/19 92/62   07/31/19 108/69   06/14/19 105/75    Pulse Readings from Last 3 Encounters:   08/02/19 67   08/02/19 73   07/31/19 72      Resp Readings from Last 3 Encounters:   08/02/19 16   09/05/17 18   01/15/16 16    SpO2 Readings from Last 3 Encounters:   08/02/19 99%   07/31/19 100%   06/14/19 94%      Temp Readings from Last 1 Encounters:   08/02/19 36.7  C (98.1  F) (Temporal)    Ht Readings from Last 1 Encounters:   07/31/19 1.727 m (5' 8\")      Wt Readings from Last 1 Encounters:   07/31/19 77.1 kg (169 lb 15.6 oz)    Estimated body mass index is 25.84 kg/m  as calculated from the following:    Height as of this encounter: 1.727 m (5' 8\").    Weight as of this encounter: 77.1 kg (169 lb 15.6 oz).     LDA:  Peripheral IV 09/05/17 Right Hand (Active)   Number of days: 696        Assessment:   ASA SCORE: 1    H&P: History and physical reviewed and following examination; no interval change.   Smoking Status:  Non-Smoker/Unknown   NPO Status: NPO Appropriate     Plan:   Anes. Type:  MAC   Pre-Medication: " None   Induction:  N/a   Airway: Native Airway   Access/Monitoring: PIV   Maintenance: N/a     Postop Plan:   Postop Pain: None  Postop Sedation/Airway: Not planned     PONV Management:   Adult Risk Factors: Female, Non-Smoker   Prevention: Ondansetron, Propofol     CONSENT: Direct conversation   Plan and risks discussed with: Patient   Blood Products: Consent Deferred (Minimal Blood Loss)                   Syed Matthews MD

## 2019-08-06 LAB — COPATH REPORT: NORMAL

## 2019-09-29 ENCOUNTER — HEALTH MAINTENANCE LETTER (OUTPATIENT)
Age: 54
End: 2019-09-29

## 2019-10-27 ENCOUNTER — HEALTH MAINTENANCE LETTER (OUTPATIENT)
Age: 54
End: 2019-10-27

## 2021-01-10 ENCOUNTER — HEALTH MAINTENANCE LETTER (OUTPATIENT)
Age: 56
End: 2021-01-10

## 2021-10-23 ENCOUNTER — HEALTH MAINTENANCE LETTER (OUTPATIENT)
Age: 56
End: 2021-10-23

## 2021-12-18 ENCOUNTER — HEALTH MAINTENANCE LETTER (OUTPATIENT)
Age: 56
End: 2021-12-18

## 2022-02-05 ENCOUNTER — TRANSFERRED RECORDS (OUTPATIENT)
Dept: HEALTH INFORMATION MANAGEMENT | Facility: CLINIC | Age: 57
End: 2022-02-05
Payer: COMMERCIAL

## 2022-02-12 ENCOUNTER — HEALTH MAINTENANCE LETTER (OUTPATIENT)
Age: 57
End: 2022-02-12

## 2022-03-25 ENCOUNTER — ANCILLARY PROCEDURE (OUTPATIENT)
Dept: MAMMOGRAPHY | Facility: CLINIC | Age: 57
End: 2022-03-25
Attending: FAMILY MEDICINE
Payer: COMMERCIAL

## 2022-03-25 DIAGNOSIS — Z12.31 VISIT FOR SCREENING MAMMOGRAM: ICD-10-CM

## 2022-03-25 PROCEDURE — 77063 BREAST TOMOSYNTHESIS BI: CPT | Mod: GC

## 2022-03-25 PROCEDURE — 77067 SCR MAMMO BI INCL CAD: CPT | Mod: GC

## 2022-03-28 ENCOUNTER — MEDICAL CORRESPONDENCE (OUTPATIENT)
Dept: HEALTH INFORMATION MANAGEMENT | Facility: CLINIC | Age: 57
End: 2022-03-28
Payer: COMMERCIAL

## 2022-06-03 ENCOUNTER — OFFICE VISIT (OUTPATIENT)
Dept: FAMILY MEDICINE | Facility: CLINIC | Age: 57
End: 2022-06-03
Payer: COMMERCIAL

## 2022-06-03 VITALS
DIASTOLIC BLOOD PRESSURE: 75 MMHG | SYSTOLIC BLOOD PRESSURE: 123 MMHG | TEMPERATURE: 98.5 F | WEIGHT: 194.6 LBS | OXYGEN SATURATION: 99 % | RESPIRATION RATE: 18 BRPM | HEIGHT: 68 IN | HEART RATE: 78 BPM | BODY MASS INDEX: 29.49 KG/M2

## 2022-06-03 DIAGNOSIS — Z13.29 SCREENING FOR THYROID DISORDER: ICD-10-CM

## 2022-06-03 DIAGNOSIS — Z00.00 ROUTINE GENERAL MEDICAL EXAMINATION AT A HEALTH CARE FACILITY: Primary | ICD-10-CM

## 2022-06-03 DIAGNOSIS — Z12.4 CERVICAL CANCER SCREENING: ICD-10-CM

## 2022-06-03 DIAGNOSIS — Q43.8 TORTUOUS COLON: ICD-10-CM

## 2022-06-03 DIAGNOSIS — Z13.0 SCREENING FOR DEFICIENCY ANEMIA: ICD-10-CM

## 2022-06-03 DIAGNOSIS — Z13.1 SCREENING FOR DIABETES MELLITUS (DM): ICD-10-CM

## 2022-06-03 DIAGNOSIS — Z12.31 ENCOUNTER FOR SCREENING MAMMOGRAM FOR MALIGNANT NEOPLASM OF BREAST: ICD-10-CM

## 2022-06-03 DIAGNOSIS — Z12.11 COLON CANCER SCREENING: ICD-10-CM

## 2022-06-03 DIAGNOSIS — L98.9 SKIN LESION: ICD-10-CM

## 2022-06-03 DIAGNOSIS — Z80.0 FAMILY HISTORY OF COLON CANCER: ICD-10-CM

## 2022-06-03 DIAGNOSIS — Z23 NEED FOR SHINGLES VACCINE: ICD-10-CM

## 2022-06-03 DIAGNOSIS — Z13.220 SCREENING FOR HYPERLIPIDEMIA: ICD-10-CM

## 2022-06-03 DIAGNOSIS — Z83.49 FAMILY HISTORY OF THYROID DISEASE: ICD-10-CM

## 2022-06-03 PROCEDURE — 90471 IMMUNIZATION ADMIN: CPT | Performed by: FAMILY MEDICINE

## 2022-06-03 PROCEDURE — 99396 PREV VISIT EST AGE 40-64: CPT | Mod: 25 | Performed by: FAMILY MEDICINE

## 2022-06-03 PROCEDURE — 90750 HZV VACC RECOMBINANT IM: CPT | Performed by: FAMILY MEDICINE

## 2022-06-03 ASSESSMENT — ENCOUNTER SYMPTOMS
ARTHRALGIAS: 0
MYALGIAS: 0
FEVER: 0
JOINT SWELLING: 0
NAUSEA: 0
FREQUENCY: 0
EYE PAIN: 0
DYSURIA: 0
ABDOMINAL PAIN: 0
DIARRHEA: 0
HEMATOCHEZIA: 0
CHILLS: 0
HEARTBURN: 0
WEAKNESS: 0
HEADACHES: 0
CONSTIPATION: 0
HEMATURIA: 0
COUGH: 0
PARESTHESIAS: 0
BREAST MASS: 0
SORE THROAT: 0
PALPITATIONS: 0
NERVOUS/ANXIOUS: 0
SHORTNESS OF BREATH: 0
DIZZINESS: 0

## 2022-06-03 ASSESSMENT — PAIN SCALES - GENERAL: PAINLEVEL: NO PAIN (0)

## 2022-06-03 NOTE — PROGRESS NOTES
SUBJECTIVE:   CC: Estela Rock is an 57 year old woman who presents for preventive health visit.       Patient has been advised of split billing requirements and indicates understanding: Yes  Healthy Habits:     Getting at least 3 servings of Calcium per day:  Yes    Bi-annual eye exam:  Yes    Dental care twice a year:  Yes    Sleep apnea or symptoms of sleep apnea:  None    Diet:  Regular (no restrictions)    Frequency of exercise:  2-3 days/week    Duration of exercise:  15-30 minutes    Taking medications regularly:  Yes    Medication side effects:  None    PHQ-2 Total Score: 0    Additional concerns today:  Yes              Today's PHQ-2 Score:   PHQ-2 (  [employer]) 6/3/2022   Q1: Little interest or pleasure in doing things 0   Q2: Feeling down, depressed or hopeless 0   PHQ-2 Score 0   PHQ-2 Total Score (12-17 Years)- Positive if 3 or more points; Administer PHQ-A if positive -   Q1: Little interest or pleasure in doing things Not at all   Q2: Feeling down, depressed or hopeless Not at all   PHQ-2 Score 0       Abuse: Current or Past (Physical, Sexual or Emotional) - No  Do you feel safe in your environment? Yes    Have you ever done Advance Care Planning? (For example, a Health Directive, POLST, or a discussion with a medical provider or your loved ones about your wishes): Yes, patient states has an Advance Care Planning document and will bring a copy to the clinic.    Social History     Tobacco Use     Smoking status: Former Smoker     Packs/day: 1.00     Years: 5.00     Pack years: 5.00     Types: Cigarettes, Cigarettes     Start date: 1983     Quit date: 1988     Years since quittin.7     Smokeless tobacco: Never Used   Substance Use Topics     Alcohol use: Yes     Comment: 1-2 drinks per week      If you drink alcohol do you typically have >3 drinks per day or >7 drinks per week? No    Alcohol Use 6/3/2022   Prescreen: >3 drinks/day or >7 drinks/week? No   Prescreen: >3 drinks/day  or >7 drinks/week? -   No flowsheet data found.    Reviewed orders with patient.  Reviewed health maintenance and updated orders accordingly - Yes  Lab work is in process  Labs reviewed in EPIC  BP Readings from Last 3 Encounters:   22 123/75   19 92/62   19 108/69    Wt Readings from Last 3 Encounters:   22 88.3 kg (194 lb 9.6 oz)   19 77.1 kg (169 lb 15.6 oz)   19 78 kg (172 lb)                  Patient Active Problem List   Diagnosis     Family history of thyroid disease     CARDIOVASCULAR SCREENING; LDL GOAL LESS THAN 160     Seasonal allergic rhinitis     Idiopathic urticaria     Family history of colon cancer, brother     Other fatigue     History of uterine fibroid     Tortuous colon     Past Surgical History:   Procedure Laterality Date     BIOPSY  2016    Mole on foot. Had questionable cells, but completely removed     COLONOSCOPY WITH CO2 INSUFFLATION N/A 2017    Procedure: COLONOSCOPY WITH CO2 INSUFFLATION;  BMI: 24.93  Referring: Dr. Wiley  Diagnosis: Screening for colon cancer   Pharmacy: [facility] [id number];  Surgeon: Duane, William Charles, MD;  Location: [facility] OR     COLONOSCOPY WITH CO2 INSUFFLATION N/A 2019    Procedure: COLONOSCOPY, WITH CO2 INSUFFLATION;  Surgeon: Duy Frey MD;  Location: [facility] OR     ENT SURGERY  2019    Septoplasty and turbinate reductution     LASIK       VASCULAR SURGERY      varicose veins       Social History     Tobacco Use     Smoking status: Former Smoker     Packs/day: 1.00     Years: 5.00     Pack years: 5.00     Types: Cigarettes, Cigarettes     Start date: 1983     Quit date: 1988     Years since quittin.7     Smokeless tobacco: Never Used   Substance Use Topics     Alcohol use: Yes     Comment: 1-2 drinks per week      Family History   Problem Relation Age of Onset     Thyroid Disease Mother         Hypothyroid     Childhood Heart Disease Mother      Obesity Mother      Colon  Cancer Father         Suspected colon cancer at 80     Coronary Artery Disease Maternal Grandmother         Rhumatic fwver as child, heart attack/ death by 60     Colon Cancer Paternal Grandfather         Colon cancer 86yrs     Colon Cancer Brother      Mental Illness Brother         Manic Depression     Colon Cancer Brother         58 years     Pancreatic Cancer Brother      Thyroid Disease Sister         with thyroid removed     Substance Abuse Sister         Meth/ other drugs     Thyroid Disease Sister         Hyper thyroid - removed         Current Outpatient Medications   Medication Sig Dispense Refill     MAGNESIUM GLYCINATE PLUS PO Take 1 tablet by mouth daily       Multiple Vitamins-Minerals (DAILY MULTIVITAMIN PO) Take 1 tablet by mouth daily       Omega-3 Fatty Acids (OMEGA-3 FISH OIL PO) Take 1,200 mg by mouth daily       VITAMIN D, CHOLECALCIFEROL, PO Take 5,000 Units by mouth daily       No Known Allergies  Recent Labs   Lab Test 04/26/19  1135 08/18/17  1108 01/22/16  0924   * 88 79    94 66   TRIG 92 60 65   ALT  --  21 22   CR  --  0.73 0.72   GFRESTIMATED  --  83 86   GFRESTBLACK  --  >90 >90  African American GFR Calc     POTASSIUM  --  4.4 4.0   TSH  --  0.95 0.97        Breast Cancer Screening:  Any new diagnosis of family breast, ovarian, or bowel cancer? No    FHS-7:   Breast CA Risk Assessment (FHS-7) 3/25/2022 6/3/2022   Did any of your first-degree relatives have breast or ovarian cancer? No No   Did any of your relatives have bilateral breast cancer? No No   Did any man in your family have breast cancer? No No   Did any woman in your family have breast and ovarian cancer? No No   Did any woman in your family have breast cancer before age 50 y? No No   Do you have 2 or more relatives with breast and/or ovarian cancer? No No   Do you have 2 or more relatives with breast and/or bowel cancer? Unknown No     click delete button to remove this line now  Mammogram Screening:  Recommended mammography every 1-2 years with patient discussion and risk factor consideration  Pertinent mammograms are reviewed under the imaging tab.    History of abnormal Pap smear: NO - age 30-65 PAP every 5 years with negative HPV co-testing recommended  PAP / HPV Latest Ref Rng & Units 5/11/2019 1/15/2016   PAP (Historical) - NIL NIL   HPV16 NEG:Negative Negative -   HPV18 NEG:Negative Negative -   HRHPV NEG:Negative Negative -     Reviewed and updated as needed this visit by clinical staff   Tobacco  Allergies  Meds   Med Hx  Surg Hx  Fam Hx  Soc Hx          Reviewed and updated as needed this visit by Provider                     Past Medical History:   Diagnosis Date     NO ACTIVE PROBLEMS       Past Surgical History:   Procedure Laterality Date     BIOPSY  Sept 2016    Mole on foot. Had questionable cells, but completely removed     COLONOSCOPY WITH CO2 INSUFFLATION N/A 09/05/2017    Procedure: COLONOSCOPY WITH CO2 INSUFFLATION;  BMI: 24.93  Referring: Dr. Wiley  Diagnosis: Screening for colon cancer   Pharmacy: [facility] [phone];  Surgeon: Duane, William Charles, MD;  Location: [facility] OR     COLONOSCOPY WITH CO2 INSUFFLATION N/A 08/02/2019    Procedure: COLONOSCOPY, WITH CO2 INSUFFLATION;  Surgeon: Duy Frey MD;  Location: [facility] OR     ENT SURGERY  August 2019    Septoplasty and turbinate reductution     LASIK       VASCULAR SURGERY      varicose veins     OB History   No obstetric history on file.       Review of Systems   Constitutional: Negative for chills and fever.   HENT: Negative for congestion, ear pain, hearing loss and sore throat.    Eyes: Negative for pain and visual disturbance.   Respiratory: Negative for cough and shortness of breath.    Cardiovascular: Negative for chest pain, palpitations and peripheral edema.   Gastrointestinal: Negative for abdominal pain, constipation, diarrhea, heartburn, hematochezia and nausea.   Breasts:  Negative for tenderness, breast  "mass and discharge.   Genitourinary: Negative for dysuria, frequency, genital sores, hematuria, pelvic pain, urgency, vaginal bleeding and vaginal discharge.   Musculoskeletal: Negative for arthralgias, joint swelling and myalgias.   Skin: Negative for rash.   Neurological: Negative for dizziness, weakness, headaches and paresthesias.   Psychiatric/Behavioral: Negative for mood changes. The patient is not nervous/anxious.      CONSTITUTIONAL: NEGATIVE for fever, chills, change in weight  INTEGUMENTARY/SKIN: Increase in size of the fleshy skin lesion on the back of left thigh 1 year-  EYES: NEGATIVE for vision changes or irritation  ENT: NEGATIVE for ear, mouth and throat problems  RESP: NEGATIVE for significant cough or SOB  BREAST: NEGATIVE for masses, tenderness or discharge  CV: NEGATIVE for chest pain, palpitations or peripheral edema  GI: NEGATIVE for nausea, abdominal pain, heartburn, or change in bowel habits  : NEGATIVE for unusual urinary or vaginal symptoms. No vaginal bleeding.  MUSCULOSKELETAL: NEGATIVE for significant arthralgias or myalgia  NEURO: NEGATIVE for weakness, dizziness or paresthesias  ENDOCRINE: NEGATIVE for temperature intolerance, skin/hair changes  HEME/ALLERGY/IMMUNE: NEGATIVE for bleeding problems  PSYCHIATRIC: NEGATIVE for changes in mood or affect      OBJECTIVE:   /75 (BP Location: Right arm, Patient Position: Sitting, Cuff Size: Adult Regular)   Pulse 78   Temp 98.5  F (36.9  C) (Oral)   Resp 18   Ht 1.715 m (5' 7.5\")   Wt 88.3 kg (194 lb 9.6 oz)   LMP 06/30/2019 (Approximate)   SpO2 99%   BMI 30.03 kg/m    Physical Exam  GENERAL APPEARANCE: healthy, alert and no distress  EYES: Eyes grossly normal to inspection, PERRL and conjunctivae and sclerae normal  HENT: ear canals and TM's normal, nose and mouth without ulcers or lesions, oropharynx clear and oral mucous membranes moist  NECK: no adenopathy, no asymmetry, masses, or scars and thyroid normal to " palpation  RESP: lungs clear to auscultation - no rales, rhonchi or wheezes  BREAST: normal without masses, tenderness or nipple discharge and no palpable axillary masses or adenopathy  CV: regular rate and rhythm, normal S1 S2, no S3 or S4, no murmur, click or rub, no peripheral edema and peripheral pulses strong  ABDOMEN: soft, nontender, no hepatosplenomegaly, no masses and bowel sounds normal  MS: no musculoskeletal defects are noted and gait is age appropriate without ataxia  SKIN: About 1 to 2 cm, fleshy skin tag-posterior left thigh  NEURO: Normal strength and tone, sensory exam grossly normal, mentation intact and speech normal  PSYCH: mentation appears normal and affect normal/bright    Diagnostic Test Results:  Labs reviewed in Epic    ASSESSMENT/PLAN:   (Z00.00) Routine general medical examination at a health care facility  (primary encounter diagnosis)  Comment:   Plan: Discussed on regular exercises, daily calcium intake, healthy eating, self breast exams monthly and routine dental checks    (Z12.31) Encounter for screening mammogram for malignant neoplasm of breast  Comment:   Plan: Reviewed normal mammogram from March 2022    (Z12.4) Cervical cancer screening  Comment:   Plan: Patient is not due for Pap until 2024    (Z12.11) Colon cancer screening  Comment:   Plan: Colonoscopy every 5 years, reviewed last colonoscopy from 2019 showing tubular adenoma of colon  With family history of colon cancer, patient will continue with colonoscopy every 5 years    (Z23) Need for shingles vaccine  Comment:   Plan: ZOSTER VACCINE RECOMBINANT ADJUVANTED         (SHINGRIX)            (Z83.49) Family history of thyroid disease  Comment:   Plan: Screen for thyroid disorder-TSH with free T4 reflux    (Z80.0) Family history of colon cancer, brother and father  Comment:   Plan: Continue colonoscopy every 5 years    (Q43.8) Tortuous colon  Comment:   Plan: We will get colonoscopy under [facility]    (L98.9) Skin lesion  Comment:  "  Plan: Patient is wishing to have it removed, patient was helped to today to schedule with Dr. Ku for lesion removal        COUNSELING:  Reviewed preventive health counseling, as reflected in patient instructions  Special attention given to:        Regular exercise       Healthy diet/nutrition       Vision screening       Immunizations    Vaccinated for: Zoster             Osteoporosis prevention/bone health       Colorectal Cancer Screening       (Elizabeth)menopause management       The 10-year ASCVD risk score (Rosemarie MALAVE Jr., et al., 2013) is: 1.8%    Values used to calculate the score:      Age: 57 years      Sex: Female      Is Non- : No      Diabetic: No      Tobacco smoker: No      Systolic Blood Pressure: 123 mmHg      Is BP treated: No      HDL Cholesterol: 100 mg/dL      Total Cholesterol: 230 mg/dL    Estimated body mass index is 30.03 kg/m  as calculated from the following:    Height as of this encounter: 1.715 m (5' 7.5\").    Weight as of this encounter: 88.3 kg (194 lb 9.6 oz).        She reports that she quit smoking about 33 years ago. Her smoking use included cigarettes and cigarettes. She started smoking about 39 years ago. She has a 5.00 pack-year smoking history. She has never used smokeless tobacco.      Counseling Resources:  ATP IV Guidelines  Pooled Cohorts Equation Calculator  Breast Cancer Risk Calculator  BRCA-Related Cancer Risk Assessment: FHS-7 Tool  FRAX Risk Assessment  ICSI Preventive Guidelines  Dietary Guidelines for Americans, 2010  USDA's MyPlate  ASA Prophylaxis  Lung CA Screening    Zahida Wiley MD  [facility]  Chart documentation done in part with Dragon Voice recognition Software. Although reviewed after completion, some word and grammatical error may remain.    "

## 2022-06-03 NOTE — PROGRESS NOTES
Prior to immunization administration, verified patients identity using patient s name and date of birth. Please see Immunization Activity for additional information.     Screening Questionnaire for Adult Immunization    Are you sick today?   No   Do you have allergies to medications, food, a vaccine component or latex?   No   Have you ever had a serious reaction after receiving a vaccination?   No   Do you have a long-term health problem with heart, lung, kidney, or metabolic disease (e.g., diabetes), asthma, a blood disorder, no spleen, complement component deficiency, a cochlear implant, or a spinal fluid leak?  Are you on long-term aspirin therapy?   No   Do you have cancer, leukemia, HIV/AIDS, or any other immune system problem?   No   Do you have a parent, brother, or sister with an immune system problem?   No   In the past 3 months, have you taken medications that affect  your immune system, such as prednisone, other steroids, or anticancer drugs; drugs for the treatment of rheumatoid arthritis, Crohn s disease, or psoriasis; or have you had radiation treatments?   No   Have you had a seizure, or a brain or other nervous system problem?   No   During the past year, have you received a transfusion of blood or blood    products, or been given immune (gamma) globulin or antiviral drug?   No   For women: Are you pregnant or is there a chance you could become       pregnant during the next month?   No   Have you received any vaccinations in the past 4 weeks?   No     Immunization questionnaire answers were all negative.        Per orders of Dr. Wiley, injection of Shingrex given by Ashia Wang RN. Patient instructed to remain in clinic for 15 minutes afterwards, and to report any adverse reaction to me immediately.       Screening performed by Ashia Wang RN on 6/3/2022 at 3:24 PM.

## 2022-06-10 ENCOUNTER — LAB (OUTPATIENT)
Dept: LAB | Facility: CLINIC | Age: 57
End: 2022-06-10
Payer: COMMERCIAL

## 2022-06-10 DIAGNOSIS — Z83.49 FAMILY HISTORY OF THYROID DISEASE: ICD-10-CM

## 2022-06-10 DIAGNOSIS — Z13.1 SCREENING FOR DIABETES MELLITUS (DM): ICD-10-CM

## 2022-06-10 DIAGNOSIS — Z13.0 SCREENING FOR DEFICIENCY ANEMIA: ICD-10-CM

## 2022-06-10 DIAGNOSIS — R73.01 ELEVATED FASTING BLOOD SUGAR: Primary | ICD-10-CM

## 2022-06-10 DIAGNOSIS — Z13.29 SCREENING FOR THYROID DISORDER: ICD-10-CM

## 2022-06-10 DIAGNOSIS — Z13.220 SCREENING FOR HYPERLIPIDEMIA: ICD-10-CM

## 2022-06-10 LAB
ALBUMIN SERPL-MCNC: 4.5 G/DL (ref 3.4–5)
ALP SERPL-CCNC: 71 U/L (ref 40–150)
ALT SERPL W P-5'-P-CCNC: 37 U/L (ref 0–50)
ANION GAP SERPL CALCULATED.3IONS-SCNC: 7 MMOL/L (ref 3–14)
AST SERPL W P-5'-P-CCNC: 19 U/L (ref 0–45)
BILIRUB SERPL-MCNC: 0.5 MG/DL (ref 0.2–1.3)
BUN SERPL-MCNC: 20 MG/DL (ref 7–30)
CALCIUM SERPL-MCNC: 10 MG/DL (ref 8.5–10.1)
CHLORIDE BLD-SCNC: 109 MMOL/L (ref 94–109)
CHOLEST SERPL-MCNC: 193 MG/DL
CO2 SERPL-SCNC: 26 MMOL/L (ref 20–32)
CREAT SERPL-MCNC: 0.8 MG/DL (ref 0.52–1.04)
ERYTHROCYTE [DISTWIDTH] IN BLOOD BY AUTOMATED COUNT: 12.5 % (ref 10–15)
FASTING STATUS PATIENT QL REPORTED: YES
GFR SERPL CREATININE-BSD FRML MDRD: 85 ML/MIN/1.73M2
GLUCOSE BLD-MCNC: 104 MG/DL (ref 70–99)
HBA1C MFR BLD: 5.1 % (ref 0–5.6)
HCT VFR BLD AUTO: 40.6 % (ref 35–47)
HDLC SERPL-MCNC: 66 MG/DL
HGB BLD-MCNC: 13.5 G/DL (ref 11.7–15.7)
LDLC SERPL CALC-MCNC: 110 MG/DL
MCH RBC QN AUTO: 30.4 PG (ref 26.5–33)
MCHC RBC AUTO-ENTMCNC: 33.3 G/DL (ref 31.5–36.5)
MCV RBC AUTO: 91 FL (ref 78–100)
NONHDLC SERPL-MCNC: 127 MG/DL
PLATELET # BLD AUTO: 243 10E3/UL (ref 150–450)
POTASSIUM BLD-SCNC: 4 MMOL/L (ref 3.4–5.3)
PROT SERPL-MCNC: 8 G/DL (ref 6.8–8.8)
RBC # BLD AUTO: 4.44 10E6/UL (ref 3.8–5.2)
SODIUM SERPL-SCNC: 142 MMOL/L (ref 133–144)
TRIGL SERPL-MCNC: 87 MG/DL
TSH SERPL DL<=0.005 MIU/L-ACNC: 0.74 MU/L (ref 0.4–4)
WBC # BLD AUTO: 6.1 10E3/UL (ref 4–11)

## 2022-06-10 PROCEDURE — 84443 ASSAY THYROID STIM HORMONE: CPT

## 2022-06-10 PROCEDURE — 85027 COMPLETE CBC AUTOMATED: CPT

## 2022-06-10 PROCEDURE — 80061 LIPID PANEL: CPT

## 2022-06-10 PROCEDURE — 36415 COLL VENOUS BLD VENIPUNCTURE: CPT

## 2022-06-10 PROCEDURE — 80053 COMPREHEN METABOLIC PANEL: CPT

## 2022-06-10 PROCEDURE — 83036 HEMOGLOBIN GLYCOSYLATED A1C: CPT

## 2022-06-10 NOTE — RESULT ENCOUNTER NOTE
Dear Devika,  Your lab test showed normal hemoglobin with no concern for anemia, normal thyroid functions, liver and kidney test and excellent fasting cholesterol numbers.  Your fasting blood sugar is slightly elevated but not concerning for diabetes  Continue with your efforts at healthy eating and regular exercises.   Let me know if you have any questions. Take care.  Zahida Wilye MD

## 2022-06-17 ENCOUNTER — OFFICE VISIT (OUTPATIENT)
Dept: FAMILY MEDICINE | Facility: CLINIC | Age: 57
End: 2022-06-17
Payer: COMMERCIAL

## 2022-06-17 VITALS
TEMPERATURE: 98 F | HEART RATE: 77 BPM | OXYGEN SATURATION: 98 % | RESPIRATION RATE: 13 BRPM | SYSTOLIC BLOOD PRESSURE: 98 MMHG | BODY MASS INDEX: 29.72 KG/M2 | WEIGHT: 192.6 LBS | DIASTOLIC BLOOD PRESSURE: 66 MMHG

## 2022-06-17 DIAGNOSIS — L91.8 SKIN TAG: Primary | ICD-10-CM

## 2022-06-17 PROCEDURE — 99207 PR DROP WITH A PROCEDURE: CPT | Mod: 25 | Performed by: FAMILY MEDICINE

## 2022-06-17 PROCEDURE — 11200 RMVL SKIN TAGS UP TO&INC 15: CPT | Performed by: FAMILY MEDICINE

## 2022-06-17 ASSESSMENT — PAIN SCALES - GENERAL: PAINLEVEL: NO PAIN (0)

## 2022-06-17 NOTE — PROGRESS NOTES
"  Assessment & Plan     Skin tag  Excision of large pedunculated skin tag posterior left thigh.  Aftercare discussed.  - REMOVAL OF SKIN TAGS, FIRST 15       BMI:   Estimated body mass index is 29.72 kg/m  as calculated from the following:    Height as of 6/3/22: 1.715 m (5' 7.5\").    Weight as of this encounter: 87.4 kg (192 lb 9.6 oz).       See Patient Instructions    Return in about 3 days (around 6/20/2022) for If not improving as expected, MyChart message.    Melina Ku MD  [facility] JODY Palmer is a 57 year old, presenting for the following health issues:  Skin Tags (Removal of skin tag, inside left thigh)      HPI     Skin Lesion  Onset/Duration: *was flat but growing a lot in last 2 years, looks like a grape hanging on a vine**  Description  Location: inside left thigh  Color: brown and pink  Border description: evenly pigmented, raised, red, gets caught on clothing and then hurts  Character: round, raised, painful, red  Itching: no  Bleeding:  no  Intensity:  Hurts when she catches it on clothing  Progression of Symptoms:  Worsening over the past 2 years  Accompanying signs and symptoms:   Bleeding: no  Scaling: no  Excessive sun exposure/tanning: no  Sunscreen used: YES  History:           Any previous history of skin cancer: had a questionable mole removed  Any family history of melanoma: no  Previous episodes of similar lesion: no  Precipitating or alleviating factors: growing over the past two years, hurts when she catches it on clothing  Therapies tried and outcome: none    Here today for removal of a large pedunculated skin tag.    Review of Systems   Constitutional, HEENT, cardiovascular, pulmonary, gi and gu systems are negative, except as otherwise noted.      Objective    BP 98/66 (BP Location: Right arm, Cuff Size: Adult Large)   Pulse 77   Temp 98  F (36.7  C) (Oral)   Resp 13   Wt 87.4 kg (192 lb 9.6 oz)   LMP 06/30/2019 (Approximate)   " SpO2 98%   BMI 29.72 kg/m    Body mass index is 29.72 kg/m .  Physical Exam   Alert, pleasant, upbeat, and in no apparent discomfort.  1-1/2 cm pedunculated skin tag with a very thin stalk posterior upper left thigh.    Discussed treatment options and informed consent obtained.  Skin prepped clean and anethitised with 1% lidocaine + epi.   1 skin tags snipped off with iris scissors.  Hemostasis with Drysol.  Bandaged.  No complications.                .  ..

## 2022-06-23 ENCOUNTER — TRANSFERRED RECORDS (OUTPATIENT)
Dept: HEALTH INFORMATION MANAGEMENT | Facility: CLINIC | Age: 57
End: 2022-06-23

## 2022-10-10 ENCOUNTER — HEALTH MAINTENANCE LETTER (OUTPATIENT)
Age: 57
End: 2022-10-10

## 2023-05-19 ENCOUNTER — OFFICE VISIT (OUTPATIENT)
Dept: FAMILY MEDICINE | Facility: CLINIC | Age: 58
End: 2023-05-19
Payer: COMMERCIAL

## 2023-05-19 VITALS
WEIGHT: 191.9 LBS | BODY MASS INDEX: 29.08 KG/M2 | RESPIRATION RATE: 11 BRPM | SYSTOLIC BLOOD PRESSURE: 104 MMHG | DIASTOLIC BLOOD PRESSURE: 72 MMHG | HEART RATE: 74 BPM | OXYGEN SATURATION: 99 % | TEMPERATURE: 97.9 F | HEIGHT: 68 IN

## 2023-05-19 DIAGNOSIS — Z13.0 SCREENING FOR DEFICIENCY ANEMIA: ICD-10-CM

## 2023-05-19 DIAGNOSIS — Z12.31 ENCOUNTER FOR SCREENING MAMMOGRAM FOR MALIGNANT NEOPLASM OF BREAST: ICD-10-CM

## 2023-05-19 DIAGNOSIS — Z12.11 COLON CANCER SCREENING: ICD-10-CM

## 2023-05-19 DIAGNOSIS — Z12.4 CERVICAL CANCER SCREENING: ICD-10-CM

## 2023-05-19 DIAGNOSIS — Z80.0 FAMILY HISTORY OF COLON CANCER: ICD-10-CM

## 2023-05-19 DIAGNOSIS — Z00.00 ROUTINE GENERAL MEDICAL EXAMINATION AT A HEALTH CARE FACILITY: Primary | ICD-10-CM

## 2023-05-19 DIAGNOSIS — Z13.1 SCREENING FOR DIABETES MELLITUS (DM): ICD-10-CM

## 2023-05-19 DIAGNOSIS — Z13.220 SCREENING FOR HYPERLIPIDEMIA: ICD-10-CM

## 2023-05-19 DIAGNOSIS — D12.6 TUBULAR ADENOMA OF COLON: ICD-10-CM

## 2023-05-19 DIAGNOSIS — R73.01 ELEVATED FASTING GLUCOSE: ICD-10-CM

## 2023-05-19 LAB
ANION GAP SERPL CALCULATED.3IONS-SCNC: 12 MMOL/L (ref 7–15)
BUN SERPL-MCNC: 15.1 MG/DL (ref 8–23)
CALCIUM SERPL-MCNC: 9.6 MG/DL (ref 8.6–10)
CHLORIDE SERPL-SCNC: 102 MMOL/L (ref 98–107)
CHOLEST SERPL-MCNC: 203 MG/DL
CREAT SERPL-MCNC: 0.81 MG/DL (ref 0.51–0.95)
DEPRECATED HCO3 PLAS-SCNC: 25 MMOL/L (ref 22–29)
ERYTHROCYTE [DISTWIDTH] IN BLOOD BY AUTOMATED COUNT: 13.1 % (ref 10–15)
GFR SERPL CREATININE-BSD FRML MDRD: 83 ML/MIN/1.73M2
GLUCOSE SERPL-MCNC: 100 MG/DL (ref 70–99)
HCT VFR BLD AUTO: 40.5 % (ref 35–47)
HDLC SERPL-MCNC: 74 MG/DL
HGB BLD-MCNC: 13.5 G/DL (ref 11.7–15.7)
LDLC SERPL CALC-MCNC: 110 MG/DL
MCH RBC QN AUTO: 30.3 PG (ref 26.5–33)
MCHC RBC AUTO-ENTMCNC: 33.3 G/DL (ref 31.5–36.5)
MCV RBC AUTO: 91 FL (ref 78–100)
NONHDLC SERPL-MCNC: 129 MG/DL
PLATELET # BLD AUTO: 201 10E3/UL (ref 150–450)
POTASSIUM SERPL-SCNC: 4.4 MMOL/L (ref 3.4–5.3)
RBC # BLD AUTO: 4.45 10E6/UL (ref 3.8–5.2)
SODIUM SERPL-SCNC: 139 MMOL/L (ref 136–145)
TRIGL SERPL-MCNC: 93 MG/DL
WBC # BLD AUTO: 5.9 10E3/UL (ref 4–11)

## 2023-05-19 PROCEDURE — 83036 HEMOGLOBIN GLYCOSYLATED A1C: CPT | Performed by: FAMILY MEDICINE

## 2023-05-19 PROCEDURE — 85027 COMPLETE CBC AUTOMATED: CPT | Performed by: FAMILY MEDICINE

## 2023-05-19 PROCEDURE — 80048 BASIC METABOLIC PNL TOTAL CA: CPT | Performed by: FAMILY MEDICINE

## 2023-05-19 PROCEDURE — 80061 LIPID PANEL: CPT | Performed by: FAMILY MEDICINE

## 2023-05-19 PROCEDURE — 36415 COLL VENOUS BLD VENIPUNCTURE: CPT | Performed by: FAMILY MEDICINE

## 2023-05-19 PROCEDURE — 99396 PREV VISIT EST AGE 40-64: CPT | Performed by: FAMILY MEDICINE

## 2023-05-19 ASSESSMENT — ENCOUNTER SYMPTOMS
PALPITATIONS: 0
DYSURIA: 0
WEAKNESS: 0
JOINT SWELLING: 0
CHILLS: 0
DIZZINESS: 0
HEMATURIA: 0
CONSTIPATION: 0
NAUSEA: 0
PARESTHESIAS: 0
SHORTNESS OF BREATH: 0
COUGH: 0
ABDOMINAL PAIN: 0
HEADACHES: 0
DIARRHEA: 0
HEARTBURN: 0
MYALGIAS: 0
FEVER: 0
NERVOUS/ANXIOUS: 0
BREAST MASS: 0
HEMATOCHEZIA: 0
EYE PAIN: 0
ARTHRALGIAS: 0
SORE THROAT: 0
FREQUENCY: 0

## 2023-05-19 ASSESSMENT — PAIN SCALES - GENERAL: PAINLEVEL: NO PAIN (0)

## 2023-05-19 NOTE — PROGRESS NOTES
SUBJECTIVE:   CC: Estela is an 58 year old who presents for preventive health visit.        View : No data to display.            Patient has been advised of split billing requirements and indicates understanding: Yes  Healthy Habits:     Getting at least 3 servings of Calcium per day:  Yes    Bi-annual eye exam:  Yes    Dental care twice a year:  Yes    Sleep apnea or symptoms of sleep apnea:  None    Diet:  Carbohydrate counting and Gluten-free/reduced    Frequency of exercise:  2-3 days/week    Duration of exercise:  45-60 minutes    Taking medications regularly:  Yes    Medication side effects:  None    PHQ-2 Total Score: 0    Additional concerns today:  No                      Social History     Tobacco Use     Smoking status: Former     Packs/day: 1.00     Years: 5.00     Pack years: 5.00     Types: Cigarettes     Start date: 1983     Quit date: 1988     Years since quittin.7     Smokeless tobacco: Never   Vaping Use     Vaping status: Never Used   Substance Use Topics     Alcohol use: Yes     Comment: 1-2 drinks per week              2023    11:11 AM   Alcohol Use   Prescreen: >3 drinks/day or >7 drinks/week? No          View : No data to display.              Reviewed orders with patient.  Reviewed health maintenance and updated orders accordingly - Yes  Lab work is in process  Labs reviewed in EPIC  BP Readings from Last 3 Encounters:   23 104/72   22 98/66   22 123/75    Wt Readings from Last 3 Encounters:   23 87 kg (191 lb 14.4 oz)   22 87.4 kg (192 lb 9.6 oz)   22 88.3 kg (194 lb 9.6 oz)                  Patient Active Problem List   Diagnosis     Family history of thyroid disease     CARDIOVASCULAR SCREENING; LDL GOAL LESS THAN 160     Seasonal allergic rhinitis     Idiopathic urticaria     Family history of colon cancer, brother     Other fatigue     History of uterine fibroid     Tortuous colon     Tubular adenoma of colon     Past Surgical  History:   Procedure Laterality Date     BIOPSY  2016    Mole on foot. Had questionable cells, but completely removed     COLONOSCOPY WITH CO2 INSUFFLATION N/A 2017    Procedure: COLONOSCOPY WITH CO2 INSUFFLATION;  BMI: 24.93  Referring: Dr. Wiley  Diagnosis: Screening for colon cancer   Pharmacy: [facility] [id number];  Surgeon: Duane, William Charles, MD;  Location: [facility] OR     COLONOSCOPY WITH CO2 INSUFFLATION N/A 2019    Procedure: COLONOSCOPY, WITH CO2 INSUFFLATION;  Surgeon: Duy Frey MD;  Location: [facility] OR     ENT SURGERY  2019    Septoplasty and turbinate reductution     LASIK       VASCULAR SURGERY      varicose veins       Social History     Tobacco Use     Smoking status: Former     Packs/day: 1.00     Years: 5.00     Pack years: 5.00     Types: Cigarettes     Start date: 1983     Quit date: 1988     Years since quittin.7     Smokeless tobacco: Never   Vaping Use     Vaping status: Never Used   Substance Use Topics     Alcohol use: Yes     Comment: 1-2 drinks per week      Family History   Problem Relation Age of Onset     Thyroid Disease Mother         Hypothyroid     Childhood Heart Disease Mother      Obesity Mother      Colon Cancer Father         Suspected colon cancer at 80     Coronary Artery Disease Maternal Grandmother         Rhumatic fwver as child, heart attack/ death by 60     Colon Cancer Paternal Grandfather         Colon cancer 86yrs     Colon Cancer Brother      Mental Illness Brother         Manic Depression     Colon Cancer Brother         58 years     Pancreatic Cancer Brother      Thyroid Disease Sister         with thyroid removed     Substance Abuse Sister         Meth/ other drugs     Thyroid Disease Sister         Hyper thyroid - removed         Current Outpatient Medications   Medication Sig Dispense Refill     MAGNESIUM GLYCINATE PLUS PO Take 1 tablet by mouth daily       Multiple Vitamins-Minerals (DAILY MULTIVITAMIN PO)  Take 1 tablet by mouth daily       Omega-3 Fatty Acids (OMEGA-3 FISH OIL PO) Take 1,200 mg by mouth daily       VITAMIN D, CHOLECALCIFEROL, PO Take 5,000 Units by mouth daily       No Known Allergies  Recent Labs   Lab Test 06/10/22  1059 04/26/19  1135 08/18/17  1108 01/22/16  0924   A1C 5.1  --   --   --    * 112* 88 79   HDL 66 100 94 66   TRIG 87 92 60 65   ALT 37  --  21 22   CR 0.80  --  0.73 0.72   GFRESTIMATED 85  --  83 86   GFRESTBLACK  --   --  >90 >90  African American GFR Calc     POTASSIUM 4.0  --  4.4 4.0   TSH 0.74  --  0.95 0.97        Breast Cancer Screening:  Any new diagnosis of family breast, ovarian, or bowel cancer? No    FHS-7:       3/25/2022    12:02 PM 6/3/2022     2:40 PM 5/19/2023    11:12 AM   Breast CA Risk Assessment (FHS-7)   Did any of your first-degree relatives have breast or ovarian cancer? No No Yes   Did any of your relatives have bilateral breast cancer? No No No   Did any man in your family have breast cancer? No No No   Did any woman in your family have breast and ovarian cancer? No No Yes   Did any woman in your family have breast cancer before age 50 y? No No No   Do you have 2 or more relatives with breast and/or ovarian cancer? No No No   Do you have 2 or more relatives with breast and/or bowel cancer? Unknown No Yes     click delete button to remove this line now  Mammogram Screening: Recommended mammography every 1-2 years with patient discussion and risk factor consideration  Pertinent mammograms are reviewed under the imaging tab.    History of abnormal Pap smear: NO - age 30-65 PAP every 5 years with negative HPV co-testing recommended      Latest Ref Rng & Units 5/11/2019     9:46 AM 1/15/2016    12:00 AM   PAP / HPV   PAP (Historical)  NIL   NIL     HPV 16 DNA NEG^Negative Negative      HPV 18 DNA NEG^Negative Negative      Other HR HPV NEG^Negative Negative        Reviewed and updated as needed this visit by clinical staff    Allergies  Meds               Reviewed and updated as needed this visit by Provider                   Past Medical History:   Diagnosis Date     NO ACTIVE PROBLEMS       Past Surgical History:   Procedure Laterality Date     BIOPSY  Sept 2016    Mole on foot. Had questionable cells, but completely removed     COLONOSCOPY WITH CO2 INSUFFLATION N/A 09/05/2017    Procedure: COLONOSCOPY WITH CO2 INSUFFLATION;  BMI: 24.93  Referring: Dr. Wiley  Diagnosis: Screening for colon cancer   Pharmacy: [facility] [id number];  Surgeon: Duane, William Charles, MD;  Location: [facility] OR     COLONOSCOPY WITH CO2 INSUFFLATION N/A 08/02/2019    Procedure: COLONOSCOPY, WITH CO2 INSUFFLATION;  Surgeon: Duy Frey MD;  Location: [facility] OR     ENT SURGERY  August 2019    Septoplasty and turbinate reductution     LASIK       VASCULAR SURGERY      varicose veins     OB History   No obstetric history on file.       Review of Systems   Constitutional: Negative for chills and fever.   HENT: Negative for congestion, ear pain, hearing loss and sore throat.    Eyes: Negative for pain and visual disturbance.   Respiratory: Negative for cough and shortness of breath.    Cardiovascular: Negative for chest pain, palpitations and peripheral edema.   Gastrointestinal: Negative for abdominal pain, constipation, diarrhea, heartburn, hematochezia and nausea.   Breasts:  Negative for tenderness, breast mass and discharge.   Genitourinary: Negative for dysuria, frequency, genital sores, hematuria, pelvic pain, urgency, vaginal bleeding and vaginal discharge.   Musculoskeletal: Negative for arthralgias, joint swelling and myalgias.   Skin: Negative for rash.   Neurological: Negative for dizziness, weakness, headaches and paresthesias.   Psychiatric/Behavioral: Negative for mood changes. The patient is not nervous/anxious.      CONSTITUTIONAL: NEGATIVE for fever, chills, change in weight  INTEGUMENTARY/SKIN: NEGATIVE for worrisome rashes, moles or lesions  EYES: NEGATIVE  "for vision changes or irritation  ENT: NEGATIVE for ear, mouth and throat problems  RESP: NEGATIVE for significant cough or SOB  BREAST: NEGATIVE for masses, tenderness or discharge  CV: NEGATIVE for chest pain, palpitations or peripheral edema  GI: NEGATIVE for nausea, abdominal pain, heartburn, or change in bowel habits  : NEGATIVE for unusual urinary or vaginal symptoms. No vaginal bleeding.  MUSCULOSKELETAL: NEGATIVE for significant arthralgias or myalgia  NEURO: NEGATIVE for weakness, dizziness or paresthesias  ENDOCRINE: NEGATIVE for temperature intolerance, skin/hair changes  HEME/ALLERGY/IMMUNE: NEGATIVE for bleeding problems  PSYCHIATRIC: NEGATIVE for changes in mood or affect      OBJECTIVE:   /72 (BP Location: Right arm, Patient Position: Sitting, Cuff Size: Adult Large)   Pulse 74   Temp 97.9  F (36.6  C) (Oral)   Resp 11   Ht 1.735 m (5' 8.31\")   Wt 87 kg (191 lb 14.4 oz)   LMP 06/18/2019   SpO2 99%   BMI 28.92 kg/m    Physical Exam  GENERAL APPEARANCE: healthy, alert and no distress  EYES: Eyes grossly normal to inspection, PERRL and conjunctivae and sclerae normal  HENT: ear canals and TM's normal, nose and mouth without ulcers or lesions, oropharynx clear and oral mucous membranes moist  NECK: no adenopathy, no asymmetry, masses, or scars and thyroid normal to palpation  RESP: lungs clear to auscultation - no rales, rhonchi or wheezes  BREAST: normal without masses, tenderness or nipple discharge and no palpable axillary masses or adenopathy  CV: regular rate and rhythm, normal S1 S2, no S3 or S4, no murmur, click or rub, no peripheral edema and peripheral pulses strong  ABDOMEN: soft, nontender, no hepatosplenomegaly, no masses and bowel sounds normal  MS: no musculoskeletal defects are noted and gait is age appropriate without ataxia  SKIN: no suspicious lesions or rashes  NEURO: Normal strength and tone, sensory exam grossly normal, mentation intact and speech normal  PSYCH: " mentation appears normal and affect normal/bright    Diagnostic Test Results:  Labs reviewed in Epic    ASSESSMENT/PLAN:   (Z00.00) Routine general medical examination at a health care facility  (primary encounter diagnosis)  Comment:   Plan: Discussed on regular exercises, daily calcium intake, healthy eating, self breast exams monthly and routine dental checks      (D12.6) Tubular adenoma of colon  Comment:   Plan: Reviewed colonoscopy from 2019 showing tubular adenoma of colon, recheck in 2024    (Z80.0) Family history of colon cancer  Comment:   Plan: as above      (Z12.11) Colon cancer screening  Comment:   Plan: as above      (Z13.0) Screening for deficiency anemia  Comment:   Plan: CBC with platelets            (Z13.1) Screening for diabetes mellitus (DM)  Comment:   Plan: Basic metabolic panel  (Ca, Cl, CO2, Creat,         Gluc, K, Na, BUN)            (Z13.220) Screening for hyperlipidemia  Comment:   Plan: Lipid panel reflex to direct LDL Fasting            (Z12.4) Cervical cancer screening  Comment:   Plan: Patient is not due for Pap until next year    (Z12.31) Encounter for screening mammogram for malignant neoplasm of breast  Comment:   Plan: MA Screening Digital Bilateral                    COUNSELING:  Reviewed preventive health counseling, as reflected in patient instructions  Special attention given to:        Regular exercise       Healthy diet/nutrition       Vision screening       Osteoporosis prevention/bone health       Colorectal Cancer Screening       (Elizabeth)menopause management       The 10-year ASCVD risk score (Ramakrishna HURLEY, et al., 2019) is: 1.7%    Values used to calculate the score:      Age: 58 years      Sex: Female      Is Non- : No      Diabetic: No      Tobacco smoker: No      Systolic Blood Pressure: 104 mmHg      Is BP treated: No      HDL Cholesterol: 66 mg/dL      Total Cholesterol: 193 mg/dL      BMI:   Estimated body mass index is 28.92 kg/m  as calculated  "from the following:    Height as of this encounter: 1.735 m (5' 8.31\").    Weight as of this encounter: 87 kg (191 lb 14.4 oz).   Weight management plan: Discussed healthy diet and exercise guidelines, improve sleep hygiene      She reports that she quit smoking about 34 years ago. Her smoking use included cigarettes and cigarettes. She started smoking about 40 years ago. She has a 5.00 pack-year smoking history. She has never used smokeless tobacco.      Chart documentation done in part with Dragon Voice recognition Software. Although reviewed after completion, some word and grammatical error may remain.  Zahida Wiley MD  [facility]  "

## 2023-05-22 LAB — HBA1C MFR BLD: 5.1 % (ref 0–5.6)

## 2023-05-22 NOTE — RESULT ENCOUNTER NOTE
Derick Palmer,  Recent labs showed normal hemoglobin, potassium, kidney functions and stable fasting cholesterol numbers.  Your fasting blood sugar is slightly high, but not concerning for diabetes with a normal A1c which is a lab for 3-month average of blood sugars.  This is good and reassuring  Continue with your efforts on healthy eating, regular exercises   Let me know if you have any questions. Take care.  Zahida Wiley MD

## 2024-02-13 ENCOUNTER — PATIENT OUTREACH (OUTPATIENT)
Dept: GASTROENTEROLOGY | Facility: CLINIC | Age: 59
End: 2024-02-13
Payer: COMMERCIAL

## 2024-02-26 ENCOUNTER — PATIENT OUTREACH (OUTPATIENT)
Dept: CARE COORDINATION | Facility: CLINIC | Age: 59
End: 2024-02-26
Payer: COMMERCIAL

## 2024-04-19 ENCOUNTER — PATIENT OUTREACH (OUTPATIENT)
Dept: CARE COORDINATION | Facility: CLINIC | Age: 59
End: 2024-04-19
Payer: COMMERCIAL

## 2024-05-02 ENCOUNTER — PATIENT OUTREACH (OUTPATIENT)
Dept: GASTROENTEROLOGY | Facility: CLINIC | Age: 59
End: 2024-05-02
Payer: COMMERCIAL

## 2024-05-02 DIAGNOSIS — Z12.11 SPECIAL SCREENING FOR MALIGNANT NEOPLASMS, COLON: Primary | ICD-10-CM

## 2024-05-02 NOTE — PROGRESS NOTES
"Hx adenomatous polyps and family hx CRC with 5yr recall recommended on last colonoscopy performed in 2019    CRC Screening Colonoscopy Referral Review    Patient meets the inclusion criteria for screening colonoscopy standing order.    Ordering/Referring Provider:  Zahida Wiley     BMI: Estimated body mass index is 28.92 kg/m  as calculated from the following:    Height as of 5/19/23: 1.735 m (5' 8.31\").    Weight as of 5/19/23: 87 kg (191 lb 14.4 oz).     Sedation:  Does patient have any of the following conditions affecting sedation?  Hx of tortuous colon or \"twisted colon\": MAC sedation recommended    Previous Scopes:  Any previous recommendations or follow up needs based on previous scope?  na / No recommendations.    Medical Concerns to Postpone Order:  Does patient have any of the following medical concerns that should postpone/delay colonoscopy referral?  No medical conditions affecting colonoscopy referral.    Final Referral Details:  Based on patient's medical history patient is appropriate for referral order with MAC/deep sedation.   BMI<= 45 45 < BMI <= 48 48 < BMI < = 50  BMI > 50   No Restrictions No [facility]  No [facility]  [facility] with exceptions Hospital Only OR Only     "

## 2024-05-03 ENCOUNTER — PATIENT OUTREACH (OUTPATIENT)
Dept: CARE COORDINATION | Facility: CLINIC | Age: 59
End: 2024-05-03
Payer: COMMERCIAL

## 2024-05-25 ENCOUNTER — HEALTH MAINTENANCE LETTER (OUTPATIENT)
Age: 59
End: 2024-05-25

## 2024-08-03 ENCOUNTER — HEALTH MAINTENANCE LETTER (OUTPATIENT)
Age: 59
End: 2024-08-03

## (undated) DEVICE — KIT ENDO FIRST STEP DISINFECTANT 200ML W/POUCH EP-4

## (undated) DEVICE — SOL WATER IRRIG 1000ML BOTTLE 07139-09

## (undated) DEVICE — PREP CHLORAPREP 26ML TINTED ORANGE  260815

## (undated) DEVICE — PAD CHUX UNDERPAD 23X24" 7136